# Patient Record
Sex: FEMALE | Race: WHITE | Employment: OTHER | URBAN - METROPOLITAN AREA
[De-identification: names, ages, dates, MRNs, and addresses within clinical notes are randomized per-mention and may not be internally consistent; named-entity substitution may affect disease eponyms.]

---

## 2021-08-17 ENCOUNTER — HOSPITAL ENCOUNTER (OUTPATIENT)
Dept: PHYSICAL THERAPY | Age: 76
Discharge: HOME | End: 2021-08-17
Payer: MEDICARE

## 2021-08-17 PROCEDURE — 97162 PT EVAL MOD COMPLEX 30 MIN: CPT

## 2021-08-17 NOTE — PROGRESS NOTES
Janes Parents  : 1945  [place]  Secondary: [facility] (2-RH) at [facility]  [address] [place] [address]  [phone]   [phone]        OUTPATIENT PHYSICAL THERAPY: Daily Treatment Note 2021  Visit Count:  1    ICD-10: Treatment Diagnosis: Radiculopathy, cervical region, M54.12  Radiculopathy, lumbar region, M54.16  Pain in joint, right shoulder, M25.511  Difficulty walking, not elsewhere classified, R26.2  Precautions/Allergies:   Patient has no allergy information on record. TREATMENT PLAN:  Effective Dates: 2021 TO 11/15/2021 (90 days). Frequency/Duration: 1 time a week for 12 weeks MEDICAL/REFERRING DIAGNOSIS:  Radiculopathy, lumbar region [M54.16]  Radiculopathy, cervical region [M54.12]   DATE OF ONSET: chronic  REFERRING PHYSICIAN: Benjamin Venegas NP MD Orders: Evaluate and Treat  Return MD Appointment:        Pre-treatment Symptoms/Complaints:  Patient reports ongoing LE pain and numbness, UE pain, neck pain and right shoulder pain and difficulty with reaching  Pain: Initial: Pain Intensity 1: 6 10 Post Session:  4/10   Medications Last Reviewed:  2021  Updated Objective Findings:  See evaluation note from today  TREATMENT:     Evaluation and education  Sciatic nerve glide and axial elongation  MedBridge Portal  Treatment/Session Summary:    · Response to Treatment:  Patient understands HEP and POC at this time. · Communication/Consultation:  None today  · Equipment provided today:  None today  · Recommendations/Intent for next treatment session: Next visit will focus on Cervical mobility, right shoulder passive motion and nerve mobility with training for stenosis. Total Treatment Billable Duration:  0 minutes  PT Patient Time In/Time Out  Time In: 1100  Time Out: [address] PT    No future appointments.

## 2021-08-17 NOTE — THERAPY EVALUATION
Amy Zelaya  : 1945  [place]  Secondary: [facility] (2-RH) at [address] [place] [address]  [phone]   [phone]          OUTPATIENT PHYSICAL THERAPY:Initial Assessment 2021   ICD-10: Treatment Diagnosis: Radiculopathy, cervical region, M54.12  Radiculopathy, lumbar region, M54.16  Pain in joint, right shoulder, M25.511  Difficulty walking, not elsewhere classified, R26.2  Precautions/Allergies:   Patient has no allergy information on record. TREATMENT PLAN:  Effective Dates: 2021 TO 11/15/2021 (90 days). Frequency/Duration: 1 time a week for 12 weeks MEDICAL/REFERRING DIAGNOSIS:  Radiculopathy, lumbar region [M54.16]  Radiculopathy, cervical region [M54.12]   DATE OF ONSET: chronic  REFERRING PHYSICIAN: Rosendo Jimenez NP MD Orders: Evaluate and Treat  Return MD Appointment:      INITIAL ASSESSMENT:  Ms. Wing Samuel presents with increased stiffness in the cervical and lumbar spine associated with stenosis. She shows limitation in all mobility of the cervical spine with some radicular pain. Her LE exhibit radicular pain and symptoms as well. Her right shoulder seems to have a different separate issue with a rotator cuff pathology. She is limited in all ER strength and overall lifting ability. She is a good candidate for skilled PT in order to address listed impairments affecting her function. Phyllis Frazier, PT, DPT, OCS, CFMT      PROBLEM LIST (Impacting functional limitations):  1. Decreased Strength  2. Decreased ADL/Functional Activities  3. Decreased Transfer Abilities  4. Decreased Ambulation Ability/Technique  5. Decreased Balance  6. Increased Pain  7. Decreased Activity Tolerance  8. Decreased Flexibility/Joint Mobility INTERVENTIONS PLANNED: (Treatment may consist of any combination of the following)  1. Balance Exercise  2.  Bed Mobility  3. Cold  4. Cryotherapy  5. Electrical Stimulation  6. Gait Training  7. Heat  8. Manual Therapy  9. Neuromuscular Re-education/Strengthening  10. Range of Motion (ROM)  11. Therapeutic Activites  12. Therapeutic Exercise/Strengthening     GOALS: (Goals have been discussed and agreed upon with patient.)  Short-Term Functional Goals: Time Frame: 4 weeks  1. Patient will show a greater than 10% improvement in left cervical rotation in order to progress driving function  2. Patient will show a greater than 10 degree increase in right shoulder ER in order to progress shoulder function  3. Patient will be independent in HEP for right shoulder passive mobility  Discharge Goals: Time Frame: 12 weeks  1. Patient will report a greater than 50% improvement in overall leg pain symptoms in order to return to walking  2. Patient will show a greater than 5 point decrease on the NDI in order to show an increase in function  3. Patient will report driving without pain increase  4. Patient will be independent in [facility] for lumbar and cervical stability exerrcises. OUTCOME MEASURE:   Tool Used: Neck Disability Index (NDI)  Score:  Initial: 19/50  Most Recent: X/50 (Date: -- )   Interpretation of Score: The Neck Disability Index is a revised form of the Oswestry Low Back Pain Index and is designed to measure the activities of daily living in person's with neck pain. Each section is scored on a 0-5 scale, 5 representing the greatest disability. The scores of each section are added together for a total score of 50. Tool Used: Modified Oswestry Low Back Pain Questionnaire  Score:  Initial: 18/50  Most Recent: X/50 (Date: -- )   Interpretation of Score: Each section is scored on a 0-5 scale, 5 representing the greatest disability. The scores of each section are added together for a total score of 50.         MEDICAL NECESSITY:   · Patient is expected to demonstrate progress in strength, range of motion, balance, coordination and functional technique to improve functional mobility. · Patient demonstrates good rehab potential due to higher previous functional level. · Skilled intervention continues to be required due to increased pain and dysfunction. REASON FOR SERVICES/OTHER COMMENTS:  · Patient continues to require skilled intervention due to decreased mobility. Total Duration:  PT Patient Time In/Time Out  Time In: 1100  Time Out: 1200    Rehabilitation Potential For Stated Goals: Excellent  Regarding Lucy SUDEEP Myers's therapy, I certify that the treatment plan above will be carried out by a therapist or under their direction. Thank you for this referral,  Lola Kenney, PT     Referring Physician Signature: Juanita Bocanegra NP _______________________________ Date _____________      PAIN/SUBJECTIVE:   Initial: Pain Intensity 1: 6 /10 Post Session:  4/10   HISTORY:   History of Injury/Illness (Reason for Referral):  Patient reports that she has been in pretty good condition till this past year and she started to have increased achy feeling in bilateral LE and some issues with her neck and R UE. She reports that if she is up and moving around she gets tired and more achy feeling in the lumbar spine and back of the legs. She also has difficulty with turning her head and feels locked up at times. She is having issues with using her R UE for anything above her shoulder height. She has had and MRI and will bring the report in next visit. She wants to be able to get back to her exercises and daily activities without pain increase. Past Medical History/Comorbidities:   Ms. Moses Zapien  has a past medical history of Cancer ([address] Ms. Moses Zapien  has a past surgical history that includes hx gyn and hx orthopaedic. She has Diabetes type II, peripheral neuropathy in B LE.  She reports some heart issues  Social History/Living Environment:       Social History     Socioeconomic History    Marital status:      Spouse name: Not on file    Number of children: Not on file    Years of education: Not on file    Highest education level: Not on file   Occupational History    Not on file   Tobacco Use    Smoking status: Never Smoker   Substance and Sexual Activity    Alcohol use: No    Drug use: No    Sexual activity: Not on file   Other Topics Concern    Not on file   Social History Narrative    Not on file     Social Determinants of Health     Financial Resource Strain:     Difficulty of Paying Living Expenses:    Food Insecurity:     Worried About Running Out of Food in the Last Year:     [address] in the Last Year:    Transportation Needs:     Lack of Transportation (Medical):  Lack of Transportation (Non-Medical):    Physical Activity:     Days of Exercise per Week:     Minutes of Exercise per Session:    Stress:     Feeling of Stress :    Social Connections:     Frequency of Communication with Friends and Family:     Frequency of Social Gatherings with Friends and Family:     Attends Faith Services:     Active Member of Clubs or Organizations:     Attends Club or Organization Meetings:     Marital Status:    Intimate Partner Violence:     Fear of Current or Ex-Partner:     Emotionally Abused:     Physically Abused:     Sexually Abused:        Prior Level of Function/Work/Activity:  Independent, retired  Dominant Side:         RIGHT   Ambulatory/Rehab [address] [place] [address] Risk Assessment   Risk Factors:       (5)  History of Recent Falls [w/in 3 months] Ability to Rise from Chair:       (1)  Pushes up, successful in one attempt   [address]:       No modifications necessary   Total: (5 or greater = High Risk): 6   ©2010 [facility] [place] [address] [place] [address] States Patent #[id number].  Federal Law prohibits the replication, distribution or use without written permission from [facility] [place]   Current Medications:       Current Outpatient Medications:    atorvastatin (LIPITOR) 40 mg tablet, Take 40 mg by mouth daily. , Disp: , Rfl:     esomeprazole (NEXIUM) 20 mg capsule, Take  by mouth daily. , Disp: , Rfl:     aspirin 81 mg chewable tablet, Take 81 mg by mouth daily. , Disp: , Rfl:     IRON/VITAMIN B COMP W-C (GERITOL COMPLETE PO), Take  by mouth., Disp: , Rfl:     calcium-vitamin D (CALCIUM 500+D) 500 mg(1,250mg) -200 unit per tablet, Take 1 Tab by mouth two (2) times daily (with meals). , Disp: , Rfl:    Date Last Reviewed:  8/17/2021     Number of Personal Factors/Comorbidities that affect the Plan of Care: 1-2: MODERATE COMPLEXITY   EXAMINATION:   Observation/Orthostatic Postural Assessment:          Patient shows increased forward head and rounded shoulders posture. She shows increased right shoulder elevation and decreased ability to raise the R UE above shoulder height. She shows increased lordosis in standing and increased left pelvic and right ribcage shift. She shows some scoliosis through her thoracic spine.     Palpation:          Tightness along paraspinals in standing and increased tightness in right QL  -Increased restriction in right and left hip rotators  -SLR shows good mobility of hamstrings  -Cervical spine shows increased suboccipital tightness, increased right upper trap restrictions and she holds it tight/compenstaion  -Decreased cervical spine mobility for PA direction in lower cervical spine  -Limited with side bending and rotation motion throughout the cervical spine  -elevated right first rib  -Increased joint crepitus in right shoulder with abduction, ER and horizontal abduction  -SCM tightness B  -Decreased diaphragmatic breathing  ROM:          Cervical spine: rotation R:60%, L:35% with pain  Side bending right to 2 fingers and left at 3 fingers  Flexion full and extension at 25%  Lumbar spine shows decreased reversal with flexion  R UE: ER at 30 deg with pain, abduction at 90 deg with compensation  Strength:          2/5 for right ER with pain, abduction 2+/5 due to pain and lack of full motion  LE show 4/5 with some weakness with hip flexion  Special Tests:          Cervical stability/vertebral artery tests:   1. Sharper Pursor: (-)  2. Alar ligament: (-)  3. Shear test: (-)  4. Tectorial membrane distraction:(-)  5. Transverse ligament lift up test: (-)      Neurological Screen:        Dermatomes:  Limited in feet due to peripheral neuropathy        Neural Tension Tests:  (+) for sciatic nerve tension  Functional Mobility:         Gait/Ambulation:  Patient shows very shortened step length and shuffling pattern. Increased forward head and trunk lean        Transfers:  Decreased weight shift and acceptance   Body Structures Involved:  1. Nerves  2. Thoracic Cage  3. Bones  4. Joints  5. Muscles  6. Ligaments Body Functions Affected:  1. Sensory/Pain  2. Neuromusculoskeletal  3. Movement Related Activities and Participation Affected:  1. General Tasks and Demands  2. Mobility  3. Self Care  4. Interpersonal Interactions and Relationships  5.  Community, Social and [place]   Number of elements (examined above) that affect the Plan of Care: 4+: HIGH COMPLEXITY   CLINICAL PRESENTATION:   Presentation: Evolving clinical presentation with changing clinical characteristics: MODERATE COMPLEXITY   CLINICAL DECISION MAKING:   Use of outcome tool(s) and clinical judgement create a POC that gives a: Questionable prediction of patient's progress: MODERATE COMPLEXITY

## 2021-08-24 ENCOUNTER — HOSPITAL ENCOUNTER (OUTPATIENT)
Dept: PHYSICAL THERAPY | Age: 76
Discharge: HOME | End: 2021-08-24
Payer: MEDICARE

## 2021-08-24 PROCEDURE — 97110 THERAPEUTIC EXERCISES: CPT

## 2021-08-24 PROCEDURE — 97140 MANUAL THERAPY 1/> REGIONS: CPT

## 2021-08-24 NOTE — PROGRESS NOTES
Jewels Stoner  : 1945  [place]  Secondary: [facility] (2-) at [facility]  [address] [place] [address]  [phone]   [phone]        OUTPATIENT PHYSICAL THERAPY: Daily Treatment Note 2021  Visit Count:  2    ICD-10: Treatment Diagnosis: Radiculopathy, cervical region, M54.12  Radiculopathy, lumbar region, M54.16  Pain in joint, right shoulder, M25.511  Difficulty walking, not elsewhere classified, R26.2  Precautions/Allergies:   Patient has no allergy information on record. TREATMENT PLAN:  Effective Dates: 2021 TO 11/15/2021 (90 days). Frequency/Duration: 1 time a week for 12 weeks MEDICAL/REFERRING DIAGNOSIS:  Radiculopathy, lumbar region [M54.16]  Radiculopathy, cervical region [M54.12]   DATE OF ONSET: chronic  REFERRING PHYSICIAN: Audrey Chisholm NP MD Orders: Evaluate and Treat  Return MD Appointment:        Pre-treatment Symptoms/Complaints:  Patient reports that the shoulder is still bothering her and her neck movement more today. She still has some numbness into both hands at times  Pain: Initial: Pain Intensity 1: 5 /10 Post Session:  4/10   Medications Last Reviewed:  2021  Updated Objective Findings:  Cervical rotation R 60% and left 45%  TREATMENT:     THERAPEUTIC EXERCISE: (15 minutes):  Exercises per grid below to improve mobility, strength, balance and coordination. Required minimal visual, verbal and manual cues to promote proper body alignment, promote proper body posture, promote proper body mechanics and promote proper body breathing techniques. Progressed resistance, range and complexity of movement as indicated.    Date:  21 Date:   Date:     Activity/Exercise Parameters Parameters Parameters   Axial elongation 10 x 10 sec hold     Wand ER Supine with arm supported x 20     Supine ER With yellow band x 20     Scap retractions X 20 in sitting     Single knee to chest 3 x 30 sec B                     MANUAL THERAPY: (40 minutes): Joint mobilization and Soft tissue mobilization was utilized and necessary because of the patient's restricted joint motion and restricted motion of soft tissue.   -Supine mobilization to cervical paraspinals, sub-occipitals, upper traps and lateral right shoulder  -Gentle traction mobilization for stenosis reduction  -PROM to cervical spine with mobilization for side bending and rotation  -Right UE distraction with perturbation  -Passive ER with end range hold for facilitation  -Forward elevation to 90 deg with distraction  -  Sciatic nerve glide and axial elongation  MedBridge Portal  Treatment/Session Summary:    · Response to Treatment: Patient shows some improvement in ER activation today. Continues to need cues for axial elongation and for relaxation of the UE  · Communication/Consultation:  None today  · Equipment provided today:  None today  · Recommendations/Intent for next treatment session: Next visit will focus on Cervical mobility, right shoulder passive motion and nerve mobility with training for stenosis.     Total Treatment Billable Duration:  55 minutes  PT Patient Time In/Time Out  Time In: 1002  Time Out: [facility], PT    Future Appointments   Date Time Provider Luzma Young   8/31/2021 10:00 AM Neftali Rothman, PT [facility]   9/7/2021 11:00 AM Chris Marques, PT [facility]   9/14/2021 10:00 AM Chris Marques, PT [facility]   9/21/2021 10:00 AM Chris Marques, PT [facility]   9/28/2021 10:00 AM Chris Marques, PT [facility]

## 2021-08-31 ENCOUNTER — HOSPITAL ENCOUNTER (OUTPATIENT)
Dept: PHYSICAL THERAPY | Age: 76
Discharge: HOME | End: 2021-08-31
Payer: MEDICARE

## 2021-08-31 PROCEDURE — 97110 THERAPEUTIC EXERCISES: CPT

## 2021-08-31 PROCEDURE — 97140 MANUAL THERAPY 1/> REGIONS: CPT

## 2021-08-31 NOTE — PROGRESS NOTES
Janes Parents  : 1945  [place]  Secondary: [facility] (2-RH) at [facility]  [address] [place] [address]  [phone]   [phone]        OUTPATIENT PHYSICAL THERAPY: Daily Treatment Note 2021  Visit Count:  3    ICD-10: Treatment Diagnosis: Radiculopathy, cervical region, M54.12  Radiculopathy, lumbar region, M54.16  Pain in joint, right shoulder, M25.511  Difficulty walking, not elsewhere classified, R26.2  Precautions/Allergies:   Patient has no allergy information on record. TREATMENT PLAN:  Effective Dates: 2021 TO 11/15/2021 (90 days). Frequency/Duration: 1 time a week for 12 weeks MEDICAL/REFERRING DIAGNOSIS:  Radiculopathy, lumbar region [M54.16]  Radiculopathy, cervical region [M54.12]   DATE OF ONSET: chronic  REFERRING PHYSICIAN: Benjamin Venegas NP MD Orders: Evaluate and Treat  Return MD Appointment:        Pre-treatment Symptoms/Complaints:  Patient reports that the shoulder is still bothering her and her neck movement more today. She had a little less numbness in the right hand and more in the left today  Pain: Initial: Pain Intensity 1: 4 /10 Post Session:  10   Medications Last Reviewed:  2021  Updated Objective Findings:  Cervical rotation R 60% and left 45%  TREATMENT:     THERAPEUTIC EXERCISE: (15 minutes):  Exercises per grid below to improve mobility, strength, balance and coordination. Required minimal visual, verbal and manual cues to promote proper body alignment, promote proper body posture, promote proper body mechanics and promote proper body breathing techniques. Progressed resistance, range and complexity of movement as indicated.    Date:  21 Date:  21 Date:     Activity/Exercise Parameters Parameters Parameters   Axial elongation 10 x 10 sec hold 10 x 10 sec hold    Wand ER Supine with arm supported x 20 Supine with arm supported x 20    Supine ER With yellow band x 20 With yellow band x 20    Scap retractions X 20 in sitting X 20 in sitting    Single knee to chest 3 x 30 sec B     Wall elevation  With towel and B UE x 20    Band extension  Bilateral with red band x 20        MANUAL THERAPY: (40 minutes): Joint mobilization and Soft tissue mobilization was utilized and necessary because of the patient's restricted joint motion and restricted motion of soft tissue.   -Supine mobilization to cervical paraspinals, sub-occipitals, upper traps and lateral right shoulder  -Gentle traction mobilization for stenosis reduction  -PROM to cervical spine with mobilization for side bending and rotation  -Right UE distraction with perturbation  -Passive ER with end range hold for facilitation  -Forward elevation to 90 deg with distraction  -  Sciatic nerve glide and axial elongation  MedBridge Portal  Treatment/Session Summary:    · Response to Treatment: Patient shows some improvement in ER activation today. Still weak through right rotator cuff with some improvement with forward elevation  · Communication/Consultation:  None today  · Equipment provided today:  None today  · Recommendations/Intent for next treatment session: Next visit will focus on Cervical mobility, right shoulder passive motion and nerve mobility with training for stenosis.     Total Treatment Billable Duration:  55 minutes  PT Patient Time In/Time Out  Time In: 8138  Time Out: [address], PT    Future Appointments   Date Time Provider Luzma Young   9/7/2021 11:00 AM Neftali Rothman, PT [facility]   9/14/2021 10:00 AM Chris Marques, PT [facility]   9/21/2021 10:00 AM Chris Marques, PT [facility]   9/28/2021 10:00 AM Chris Marques, PT [facility]

## 2021-09-07 ENCOUNTER — HOSPITAL ENCOUNTER (OUTPATIENT)
Dept: PHYSICAL THERAPY | Age: 76
Discharge: HOME | End: 2021-09-07
Payer: MEDICARE

## 2021-09-07 PROCEDURE — 97110 THERAPEUTIC EXERCISES: CPT

## 2021-09-07 PROCEDURE — 97140 MANUAL THERAPY 1/> REGIONS: CPT

## 2021-09-07 NOTE — PROGRESS NOTES
Kyle Paulson  : 1945  [place]  Secondary: [facility] (2-) at [facility]  [address] [place] [address]  [phone]   [phone]        OUTPATIENT PHYSICAL THERAPY: Daily Treatment Note 2021  Visit Count:  4    ICD-10: Treatment Diagnosis: Radiculopathy, cervical region, M54.12  Radiculopathy, lumbar region, M54.16  Pain in joint, right shoulder, M25.511  Difficulty walking, not elsewhere classified, R26.2  Precautions/Allergies:   Patient has no allergy information on record. TREATMENT PLAN:  Effective Dates: 2021 TO 11/15/2021 (90 days). Frequency/Duration: 1 time a week for 12 weeks MEDICAL/REFERRING DIAGNOSIS:  Radiculopathy, lumbar region [M54.16]  Radiculopathy, cervical region [M54.12]   DATE OF ONSET: chronic  REFERRING PHYSICIAN: Casie Dietz NP MD Orders: Evaluate and Treat  Return MD Appointment:        Pre-treatment Symptoms/Complaints:  Patient reports that the shoulder is doing a little better today with less pain. She reports still a little more pain in the legs  Pain: Initial: Pain Intensity 1: 3 /10 Post Session:  10   Medications Last Reviewed:  2021  Updated Objective Findings:  Cervical rotation R 60% and left 45%  TREATMENT:     THERAPEUTIC EXERCISE: (15 minutes):  Exercises per grid below to improve mobility, strength, balance and coordination. Required minimal visual, verbal and manual cues to promote proper body alignment, promote proper body posture, promote proper body mechanics and promote proper body breathing techniques. Progressed resistance, range and complexity of movement as indicated.    Date:  21 Date:  21 Date:  21   Activity/Exercise Parameters Parameters Parameters   Axial elongation 10 x 10 sec hold 10 x 10 sec hold 10 x 10 sec hold   Wand ER Supine with arm supported x 20 Supine with arm supported x 20    Supine ER With yellow band x 20 With yellow band x 20 Standing walk outs with yellow band x 1 min   Scap retractions X 20 in sitting X 20 in sitting Standing with green band rows x 20   Single knee to chest 3 x 30 sec B     Wall elevation  With towel and B UE x 20 With towel and B UE x 20   Band extension  Bilateral with red band x 20 Bilateral with red band x 20   Hamstring glides   X 10 B   Lower trunk rotations   X 10 B   Breathing training   X 5 min       MANUAL THERAPY: (40 minutes): Joint mobilization and Soft tissue mobilization was utilized and necessary because of the patient's restricted joint motion and restricted motion of soft tissue.   -Supine mobilization to cervical paraspinals, sub-occipitals, upper traps and lateral right shoulder  -Gentle traction mobilization for stenosis reduction  -PROM to cervical spine with mobilization for side bending and rotation  -Right UE distraction with perturbation  -Passive ER with end range hold for facilitation  -Forward elevation to 90 deg with distraction  -  Sciatic nerve glide and axial elongation  MedBridge Portal  Treatment/Session Summary:    · Response to Treatment: Patient shows some improvement in ER activation today. We worked more on breathing today to help facilitate relaxation  · Communication/Consultation:  None today  · Equipment provided today:  None today  · Recommendations/Intent for next treatment session: Next visit will focus on Cervical mobility, right shoulder passive motion and nerve mobility with training for stenosis.     Total Treatment Billable Duration:  55 minutes  PT Patient Time In/Time Out  Time In: 1102  Time Out: \[facility]\"" [address] PT    Future Appointments   Date Time Provider Luzma Young   9/14/2021 10:00 AM Heather Hendrix, PT [facility]   9/21/2021 10:00 AM Jeremy Marques, PT [facility]   9/28/2021 10:00 AM Jeremy Marques, PT [facility]

## 2021-09-14 ENCOUNTER — HOSPITAL ENCOUNTER (OUTPATIENT)
Dept: PHYSICAL THERAPY | Age: 76
Discharge: HOME | End: 2021-09-14
Payer: MEDICARE

## 2021-09-14 PROCEDURE — 97140 MANUAL THERAPY 1/> REGIONS: CPT

## 2021-09-14 PROCEDURE — 97110 THERAPEUTIC EXERCISES: CPT

## 2021-09-14 NOTE — PROGRESS NOTES
Sukumar Singh  : 1945  [place]  Secondary: [facility] (2-) at [facility]  [address] [place] [address]  [phone]   [phone]        OUTPATIENT PHYSICAL THERAPY: Daily Treatment Note 2021  Visit Count:  5    ICD-10: Treatment Diagnosis: Radiculopathy, cervical region, M54.12  Radiculopathy, lumbar region, M54.16  Pain in joint, right shoulder, M25.511  Difficulty walking, not elsewhere classified, R26.2  Precautions/Allergies:   Patient has no allergy information on record. TREATMENT PLAN:  Effective Dates: 2021 TO 11/15/2021 (90 days). Frequency/Duration: 1 time a week for 12 weeks MEDICAL/REFERRING DIAGNOSIS:  Radiculopathy, lumbar region [M54.16]  Radiculopathy, cervical region [M54.12]   DATE OF ONSET: chronic  REFERRING PHYSICIAN: Juanita Bocanegra NP MD Orders: Evaluate and Treat  Return MD Appointment:        Pre-treatment Symptoms/Complaints:  Patient reports that the neck is pretty stiff today. She is getting an MRI today on her lower back  Pain: Initial: Pain Intensity 1: 4 /10 Post Session:  4/10   Medications Last Reviewed:  2021  Updated Objective Findings:  Cervical rotation R 60% and left 45%  TREATMENT:     THERAPEUTIC EXERCISE: (15 minutes):  Exercises per grid below to improve mobility, strength, balance and coordination. Required minimal visual, verbal and manual cues to promote proper body alignment, promote proper body posture, promote proper body mechanics and promote proper body breathing techniques. Progressed resistance, range and complexity of movement as indicated.    Date:  21 Date:  21 Date:  21 Date:  21   Activity/Exercise Parameters Parameters Parameters    Axial elongation 10 x 10 sec hold 10 x 10 sec hold 10 x 10 sec hold 10 x 10 sec hold with need cues for technique   Wand ER Supine with arm supported x 20 Supine with arm supported x 20     Supine ER With yellow band x 20 With yellow band x 20 Standing walk outs with yellow band x 1 min    Scap retractions X 20 in sitting X 20 in sitting Standing with green band rows x 20 Pivot prone x 20   Single knee to chest 3 x 30 sec B      Wall elevation  With towel and B UE x 20 With towel and B UE x 20 With towel and B UE x 20   Band extension  Bilateral with red band x 20 Bilateral with red band x 20    Hamstring glides   X 10 B X 10 B   Lower trunk rotations   X 10 B X 10 B   Breathing training   X 5 min X 10 B       MANUAL THERAPY: (40 minutes): Joint mobilization and Soft tissue mobilization was utilized and necessary because of the patient's restricted joint motion and restricted motion of soft tissue.   -Supine mobilization to cervical paraspinals, sub-occipitals, upper traps and lateral right shoulder  -Gentle traction mobilization for stenosis reduction  -PROM to cervical spine mobilization for side bending and rotation  -Right UE distraction with perturbation  -Passive ER with end range hold for facilitation  -Forward elevation to 90 deg with distraction  -  Sciatic nerve glide and axial elongation  MedBridge Portal  Treatment/Session Summary:    · Response to Treatment: Patient shows improvement in forward elevation to 125 deg. Continue to work on cervical decompression for nerve issues  · Communication/Consultation:  None today  · Equipment provided today:  None today  · Recommendations/Intent for next treatment session: Next visit will focus on Cervical mobility, right shoulder passive motion and nerve mobility with training for stenosis.     Total Treatment Billable Duration:  55 minutes  PT Patient Time In/Time Out  Time In: 1000  Time Out: [address], PT    Future Appointments   Date Time Provider Luzma Young   9/21/2021 10:00 AM Trina Chavira, PT [facility]   9/28/2021 10:00 AM Rocco Marques, ALEJANDRO [facility]

## 2021-09-21 ENCOUNTER — HOSPITAL ENCOUNTER (OUTPATIENT)
Dept: PHYSICAL THERAPY | Age: 76
Discharge: HOME | End: 2021-09-21
Payer: MEDICARE

## 2021-09-21 PROCEDURE — 97110 THERAPEUTIC EXERCISES: CPT

## 2021-09-21 PROCEDURE — 97140 MANUAL THERAPY 1/> REGIONS: CPT

## 2021-09-21 NOTE — PROGRESS NOTES
Lorraine End  : 1945  [place]  Secondary: [facility] (2-) at [facility]  [address] [place] [address]  [phone]   [phone]        OUTPATIENT PHYSICAL THERAPY: Daily Treatment Note 2021  Visit Count:  6    ICD-10: Treatment Diagnosis: Radiculopathy, cervical region, M54.12  Radiculopathy, lumbar region, M54.16  Pain in joint, right shoulder, M25.511  Difficulty walking, not elsewhere classified, R26.2  Precautions/Allergies:   Patient has no allergy information on record. TREATMENT PLAN:  Effective Dates: 2021 TO 11/15/2021 (90 days). Frequency/Duration: 1 time a week for 12 weeks MEDICAL/REFERRING DIAGNOSIS:  Radiculopathy, lumbar region [M54.16]  Radiculopathy, cervical region [M54.12]   DATE OF ONSET: chronic  REFERRING PHYSICIAN: Sumi Doan NP MD Orders: Evaluate and Treat  Return MD Appointment:        Pre-treatment Symptoms/Complaints:  Patient reports that the shoulder seems to be moving a little better and stiff at times  Pain: Initial: Pain Intensity 1: 5 /10 Post Session:  4/10   Medications Last Reviewed:  2021  Updated Objective Findings:  Cervical rotation R 60% and left 45%  TREATMENT:     THERAPEUTIC EXERCISE: (15 minutes):  Exercises per grid below to improve mobility, strength, balance and coordination. Required minimal visual, verbal and manual cues to promote proper body alignment, promote proper body posture, promote proper body mechanics and promote proper body breathing techniques. Progressed resistance, range and complexity of movement as indicated.    Date:  21 Date:  21 Date:  21 Date:  21 Date:  21   Activity/Exercise Parameters Parameters Parameters     Axial elongation 10 x 10 sec hold 10 x 10 sec hold 10 x 10 sec hold 10 x 10 sec hold with need cues for technique 10 x 10 sec hold with need cues for technique   Wand ER Supine with arm supported x 20 Supine with arm supported x 20      Supine ER With yellow band x 20 With yellow band x 20 Standing walk outs with yellow band x 1 min  Sitting ER with yellow band x 20   Scap retractions X 20 in sitting X 20 in sitting Standing with green band rows x 20 Pivot prone x 20    Single knee to chest 3 x 30 sec B       Wall elevation  With towel and B UE x 20 With towel and B UE x 20 With towel and B UE x 20 With towel and B UE x 20 circles as well today x 15 CW/CCW   Band extension  Bilateral with red band x 20 Bilateral with red band x 20  Bilateral with red band x 20   Hamstring glides   X 10 B X 10 B    Lower trunk rotations   X 10 B X 10 B    Breathing training   X 5 min X 10 B    UBE     3 min forward/3 min backward       MANUAL THERAPY: (30 minutes): Joint mobilization and Soft tissue mobilization was utilized and necessary because of the patient's restricted joint motion and restricted motion of soft tissue.   -Supine mobilization to cervical paraspinals, sub-occipitals, upper traps and lateral right shoulder  -Gentle traction mobilization for stenosis reduction  -PROM to cervical spine mobilization for side bending and rotation  -Right UE distraction with perturbation  -Passive ER with end range hold for facilitation  -Forward elevation to 90 deg with distraction  -  Sciatic nerve glide and axial elongation  MedBridge Portal  Treatment/Session Summary:    · Response to Treatment: Patient shows improvement in forward elevation to 125 deg. She shows less compensation today with the shoulder   · Communication/Consultation:  None today  · Equipment provided today:  None today  · Recommendations/Intent for next treatment session: Next visit will focus on Cervical mobility, right shoulder passive motion and nerve mobility with training for stenosis.     Total Treatment Billable Duration:  55 minutes  PT Patient Time In/Time Out  Time In: 1007  Time Out: [address] [facility], PT    Future Appointments   Date Time Provider Luzma Young   9/28/2021 10:00 AM Quinn Doretha, PT [facility]

## 2021-09-28 ENCOUNTER — HOSPITAL ENCOUNTER (OUTPATIENT)
Dept: PHYSICAL THERAPY | Age: 76
Discharge: HOME | End: 2021-09-28
Payer: MEDICARE

## 2021-09-28 PROCEDURE — 97110 THERAPEUTIC EXERCISES: CPT

## 2021-09-28 PROCEDURE — 97140 MANUAL THERAPY 1/> REGIONS: CPT

## 2021-09-28 NOTE — PROGRESS NOTES
Betito Durán  : 1945  [place]  Secondary: [facility] (2-) at [facility]  [address] [place] [address]  [phone]   [phone]        OUTPATIENT PHYSICAL THERAPY: Daily Treatment Note 2021  Visit Count:  7    ICD-10: Treatment Diagnosis: Radiculopathy, cervical region, M54.12  Radiculopathy, lumbar region, M54.16  Pain in joint, right shoulder, M25.511  Difficulty walking, not elsewhere classified, R26.2  Precautions/Allergies:   Patient has no allergy information on record. TREATMENT PLAN:  Effective Dates: 2021 TO 11/15/2021 (90 days). Frequency/Duration: 1 time a week for 12 weeks MEDICAL/REFERRING DIAGNOSIS:  Radiculopathy, lumbar region [M54.16]  Radiculopathy, cervical region [M54.12]   DATE OF ONSET: chronic  REFERRING PHYSICIAN: Haim Ulloa NP MD Orders: Evaluate and Treat  Return MD Appointment:        Pre-treatment Symptoms/Complaints:  Patient reports that the shoulder seems to be moving a little better with some neck pain today  Pain: Initial: Pain Intensity 1: 4 /10 Post Session:  4/10   Medications Last Reviewed:  2021  Updated Objective Findings:  Cervical rotation R 60% and left 45%  TREATMENT:     THERAPEUTIC EXERCISE: (15 minutes):  Exercises per grid below to improve mobility, strength, balance and coordination. Required minimal visual, verbal and manual cues to promote proper body alignment, promote proper body posture, promote proper body mechanics and promote proper body breathing techniques. Progressed resistance, range and complexity of movement as indicated.    Date:  21 Date:  21 Date:  21 Date:  21 Date:  21 Date:  21 Date:     Activity/Exercise Parameters Parameters Parameters       Axial elongation 10 x 10 sec hold 10 x 10 sec hold 10 x 10 sec hold 10 x 10 sec hold with need cues for technique 10 x 10 sec hold with need cues for technique 10 x 10 sec hold with need cues for technique    Wand ER Supine with arm supported x 20 Supine with arm supported x 20        Supine ER With yellow band x 20 With yellow band x 20 Standing walk outs with yellow band x 1 min  Sitting ER with yellow band x 20     Scap retractions X 20 in sitting X 20 in sitting Standing with green band rows x 20 Pivot prone x 20      Single knee to chest 3 x 30 sec B         Wall elevation  With towel and B UE x 20 With towel and B UE x 20 With towel and B UE x 20 With towel and B UE x 20 circles as well today x 15 CW/CCW reviewed    Band extension  Bilateral with red band x 20 Bilateral with red band x 20  Bilateral with red band x 20     Hamstring glides   X 10 B X 10 B      Lower trunk rotations   X 10 B X 10 B      Breathing training   X 5 min X 10 B      UBE     3 min forward/3 min backward 3 min forward/3 min backward    Pulleys      X 3 min        MANUAL THERAPY: (30 minutes): Joint mobilization and Soft tissue mobilization was utilized and necessary because of the patient's restricted joint motion and restricted motion of soft tissue.   -Supine mobilization to cervical paraspinals, sub-occipitals, upper traps and lateral right shoulder  -Gentle traction mobilization for stenosis reduction  -PROM to cervical spine mobilization for side bending and rotation  -Right UE distraction with perturbation  -Passive ER with end range hold for facilitation  -Forward elevation to 90 deg with distraction  -  Sciatic nerve glide and axial elongation  MedBridge Portal  Treatment/Session Summary:    · Response to Treatment: Patient shows improvement in forward elevation to 125 deg.  Still some increased guarding with cervical mobility and some right sided tightness after treatment  · Communication/Consultation:  None today  · Equipment provided today:  None today  · Recommendations/Intent for next treatment session: Next visit will focus on Cervical mobility, right shoulder passive motion and nerve mobility with training for stenosis.     Total Treatment Billable Duration:  55 minutes  PT Patient Time In/Time Out  Time In: 1001  Time Out: [address], PT    Future Appointments   Date Time Provider Luzma Young   10/12/2021 10:00 AM Pilar Amaro, PT [facility]   10/26/2021 10:00 AM Darren Marques, PT [facility]

## 2021-10-12 ENCOUNTER — HOSPITAL ENCOUNTER (OUTPATIENT)
Dept: PHYSICAL THERAPY | Age: 76
Discharge: HOME | End: 2021-10-12
Payer: MEDICARE

## 2021-10-12 PROCEDURE — 97110 THERAPEUTIC EXERCISES: CPT

## 2021-10-12 PROCEDURE — 97140 MANUAL THERAPY 1/> REGIONS: CPT

## 2021-10-12 NOTE — PROGRESS NOTES
Sang Medina  : 1945  [place]  Secondary: [facility] (2-) at [facility]  [address] [place] [address]  [phone]   [phone]        OUTPATIENT PHYSICAL THERAPY: Daily Treatment Note 10/12/2021  Visit Count:  8    ICD-10: Treatment Diagnosis: Radiculopathy, cervical region, M54.12  Radiculopathy, lumbar region, M54.16  Pain in joint, right shoulder, M25.511  Difficulty walking, not elsewhere classified, R26.2  Precautions/Allergies:   Patient has no allergy information on record. TREATMENT PLAN:  Effective Dates: 2021 TO 11/15/2021 (90 days). Frequency/Duration: 1 time a week for 12 weeks MEDICAL/REFERRING DIAGNOSIS:  Radiculopathy, lumbar region [M54.16]  Radiculopathy, cervical region [M54.12]   DATE OF ONSET: chronic  REFERRING PHYSICIAN: Agnieszka Bear NP MD Orders: Evaluate and Treat  Return MD Appointment:        Pre-treatment Symptoms/Complaints:  Patient reports that the shoulder seems to be moving a little better with some neck pain today  Pain: Initial: Pain Intensity 1: 3 10 Post Session:  4/10   Medications Last Reviewed:  10/12/2021  Updated Objective Findings:  Cervical rotation R 60% and left 45%  TREATMENT:     THERAPEUTIC EXERCISE: (14 minutes):  Exercises per grid below to improve mobility, strength, balance and coordination. Required minimal visual, verbal and manual cues to promote proper body alignment, promote proper body posture, promote proper body mechanics and promote proper body breathing techniques. Progressed resistance, range and complexity of movement as indicated.    Date:  21 Date:  21 Date:  21 Date:  21 Date:  21 Date:  21 Date:  10/12/21   Activity/Exercise Parameters Parameters Parameters       Axial elongation 10 x 10 sec hold 10 x 10 sec hold 10 x 10 sec hold 10 x 10 sec hold with need cues for technique 10 x 10 sec hold with need cues for technique 10 x 10 sec hold with need cues for technique 10 x 10 sec hold with need cues for technique   Wand ER Supine with arm supported x 20 Supine with arm supported x 20        Supine ER With yellow band x 20 With yellow band x 20 Standing walk outs with yellow band x 1 min  Sitting ER with yellow band x 20     Scap retractions X 20 in sitting X 20 in sitting Standing with green band rows x 20 Pivot prone x 20   X 10   Single knee to chest 3 x 30 sec B         Wall elevation  With towel and B UE x 20 With towel and B UE x 20 With towel and B UE x 20 With towel and B UE x 20 circles as well today x 15 CW/CCW reviewed With towel and B UE x 20 circles as well today x 15   Band extension  Bilateral with red band x 20 Bilateral with red band x 20  Bilateral with red band x 20  Bilateral with red band x 20   Hamstring glides   X 10 B X 10 B      Lower trunk rotations   X 10 B X 10 B      Breathing training   X 5 min X 10 B      UBE     3 min forward/3 min backward 3 min forward/3 min backward 3 min forward/3 min backward   Pulleys      X 3 min        MANUAL THERAPY: (30 minutes): Joint mobilization and Soft tissue mobilization was utilized and necessary because of the patient's restricted joint motion and restricted motion of soft tissue.   -Supine mobilization to cervical paraspinals, sub-occipitals, upper traps and lateral right shoulder  -Gentle traction mobilization for stenosis reduction  -PROM to cervical spine mobilization for side bending and rotation  -Right UE distraction with perturbation  -Passive ER with end range hold for facilitation  -Forward elevation to 90 deg with distraction  -  Sciatic nerve glide and axial elongation  [facility] Portal  Treatment/Session Summary:    · Response to Treatment: Patient shows improvement in forward elevation to 125 deg.  Pain is decreasing for the most part,but she stays guarded some  · Communication/Consultation:  None today  · Equipment provided today:  None today  · Recommendations/Intent for next treatment session: Next visit will focus on Cervical mobility, right shoulder passive motion and nerve mobility with training for stenosis.     Total Treatment Billable Duration:  44 minutes  PT Patient Time In/Time Out  Time In: 1010  Time Out: 9246 Xander Garza, PT    Future Appointments   Date Time Provider Luzma Young   10/26/2021 10:00 AM Tamar Hopper, PT [facility]

## 2021-10-26 ENCOUNTER — HOSPITAL ENCOUNTER (OUTPATIENT)
Dept: PHYSICAL THERAPY | Age: 76
Discharge: HOME | End: 2021-10-26
Payer: MEDICARE

## 2021-10-26 PROCEDURE — 97140 MANUAL THERAPY 1/> REGIONS: CPT

## 2021-10-26 PROCEDURE — 97110 THERAPEUTIC EXERCISES: CPT

## 2021-10-26 NOTE — PROGRESS NOTES
Dileep [place]lain  : 1945  [place]  Secondary: [facility] at [facility]  [address] [place] [address]  [phone]   [phone]        OUTPATIENT PHYSICAL THERAPY: Daily Treatment Note 10/26/2021  Visit Count:  9    ICD-10: Treatment Diagnosis: Radiculopathy, cervical region, M54.12  Radiculopathy, lumbar region, M54.16  Pain in joint, right shoulder, M25.511  Difficulty walking, not elsewhere classified, R26.2  Precautions/Allergies:   Patient has no allergy information on record. TREATMENT PLAN:  Effective Dates: 2021 TO 11/15/2021 (90 days). Frequency/Duration: 1 time a week for 12 weeks MEDICAL/REFERRING DIAGNOSIS:  Radiculopathy, lumbar region [M54.16]  Radiculopathy, cervical region [M54.12]   DATE OF ONSET: chronic  REFERRING PHYSICIAN: Molina Ford NP MD Orders: Evaluate and Treat  Return MD Appointment:        Pre-treatment Symptoms/Complaints:  Patient reports that the shoulder seems to be moving a little better with some neck pain today, but that is a little better too. She has started a steroid dose pack for the back pain and that helps some, but that seems to have made her BP go up a little  Pain: Initial: Pain Intensity 1: 4 /10 Post Session:  4/10   Medications Last Reviewed:  10/26/2021  Updated Objective Findings:  Cervical rotation R 60% and left 45%, /82 mmHg at beginning of session  TREATMENT:     THERAPEUTIC EXERCISE: (15 minutes):  Exercises per grid below to improve mobility, strength, balance and coordination. Required minimal visual, verbal and manual cues to promote proper body alignment, promote proper body posture, promote proper body mechanics and promote proper body breathing techniques. Progressed resistance, range and complexity of movement as indicated.    Date:  21 Date:  21 Date:  21 Date:  21 Date:  21 Date:  21 Date:  10/12/21 Date:  10/26/21   Activity/Exercise Parameters Parameters Parameters        Axial elongation 10 x 10 sec hold 10 x 10 sec hold 10 x 10 sec hold 10 x 10 sec hold with need cues for technique 10 x 10 sec hold with need cues for technique 10 x 10 sec hold with need cues for technique 10 x 10 sec hold with need cues for technique 10 x 10 sec hold with need cues for technique   Wand ER Supine with arm supported x 20 Supine with arm supported x 20         Supine ER With yellow band x 20 With yellow band x 20 Standing walk outs with yellow band x 1 min  Sitting ER with yellow band x 20      Scap retractions X 20 in sitting X 20 in sitting Standing with green band rows x 20 Pivot prone x 20   X 10    Single knee to chest 3 x 30 sec B          Wall elevation  With towel and B UE x 20 With towel and B UE x 20 With towel and B UE x 20 With towel and B UE x 20 circles as well today x 15 CW/CCW reviewed With towel and B UE x 20 circles as well today x 15 With towel and B UE x 20 circles as well today x 15   Band extension  Bilateral with red band x 20 Bilateral with red band x 20  Bilateral with red band x 20  Bilateral with red band x 20    Hamstring glides   X 10 B X 10 B    X 10 B   Lower trunk rotations   X 10 B X 10 B       Breathing training   X 5 min X 10 B       UBE     3 min forward/3 min backward 3 min forward/3 min backward 3 min forward/3 min backward 3 min forward/3 min backward   Pulleys      X 3 min         MANUAL THERAPY: (40 minutes): Joint mobilization and Soft tissue mobilization was utilized and necessary because of the patient's restricted joint motion and restricted motion of soft tissue.   -Supine mobilization to cervical paraspinals, sub-occipitals, upper traps and lateral right shoulder  -Gentle traction mobilization for stenosis reduction  -PROM to cervical spine mobilization for side bending and rotation  -Right UE distraction with perturbation  -Passive ER with end range hold for facilitation  -Forward elevation to 90 deg with distraction  -  Sciatic nerve glide and axial elongation  -Hip piriformis stretch B  MedBridge Portal  Treatment/Session Summary:    · Response to Treatment: Patient shows improvement in shoulder mobility. Start to assess the lumbar radiculopathy now. · Communication/Consultation:  None today  · Equipment provided today:  None today  · Recommendations/Intent for next treatment session: Next visit will focus on Cervical mobility, right shoulder passive motion and nerve mobility with training for stenosis.     Total Treatment Billable Duration:  55 minutes  PT Patient Time In/Time Out  Time In: 1000  Time Out: ALEJANDRO Frye    Future Appointments   Date Time Provider Luzma Young   11/11/2021  9:00 AM Shantell Aguilar PT [facility]

## 2021-11-12 ENCOUNTER — HOSPITAL ENCOUNTER (OUTPATIENT)
Dept: PHYSICAL THERAPY | Age: 76
Discharge: HOME | End: 2021-11-12
Payer: MEDICARE

## 2021-11-12 PROCEDURE — 97140 MANUAL THERAPY 1/> REGIONS: CPT

## 2021-11-12 NOTE — PROGRESS NOTES
Jaime Overton  : 1945  [place]  Secondary: [facility] (2-) at [facility]  [address] [place] [address]  [phone]   [phone]        OUTPATIENT PHYSICAL THERAPY: Daily Treatment Note 2021  Visit Count:  10    ICD-10: Treatment Diagnosis: Radiculopathy, cervical region, M54.12  Radiculopathy, lumbar region, M54.16  Pain in joint, right shoulder, M25.511  Difficulty walking, not elsewhere classified, R26.2  Precautions/Allergies:   Patient has no allergy information on record. TREATMENT PLAN:  Effective Dates: 2021 TO 22 (90 days). Frequency/Duration: 1 time every 3-4 weeks for 12 weeks MEDICAL/REFERRING DIAGNOSIS:  Radiculopathy, lumbar region [M54.16]  Radiculopathy, cervical region [M54.12]   DATE OF ONSET: chronic  REFERRING PHYSICIAN: Justin Avilez NP MD Orders: Evaluate and Treat  Return MD Appointment:        Pre-treatment Symptoms/Complaints:  Patient reports that she still has some limitations in the neck and shoulder, but the pain has been much better over the last week. Pain: Initial: Pain Intensity 1: 2 /10 Post Session:  4/10   Medications Last Reviewed:  2021  Updated Objective Findings:  Cervical rotation R 60% and left 45%,   TREATMENT:     THERAPEUTIC EXERCISE: (5 minutes):  Exercises per grid below to improve mobility, strength, balance and coordination. Required minimal visual, verbal and manual cues to promote proper body alignment, promote proper body posture, promote proper body mechanics and promote proper body breathing techniques. Progressed resistance, range and complexity of movement as indicated.    Date:  21 Date:  21 Date:  21 Date:  21 Date:  21 Date:  21 Date:  10/12/21 Date:  10/26/21   Activity/Exercise Parameters Parameters Parameters        Axial elongation 10 x 10 sec hold 10 x 10 sec hold 10 x 10 sec hold 10 x 10 sec hold with need cues for technique 10 x 10 sec hold with need cues for technique 10 x 10 sec hold with need cues for technique 10 x 10 sec hold with need cues for technique 10 x 10 sec hold with need cues for technique   Wand ER Supine with arm supported x 20 Supine with arm supported x 20         Supine ER With yellow band x 20 With yellow band x 20 Standing walk outs with yellow band x 1 min  Sitting ER with yellow band x 20      Scap retractions X 20 in sitting X 20 in sitting Standing with green band rows x 20 Pivot prone x 20   X 10    Single knee to chest 3 x 30 sec B          Wall elevation  With towel and B UE x 20 With towel and B UE x 20 With towel and B UE x 20 With towel and B UE x 20 circles as well today x 15 CW/CCW reviewed With towel and B UE x 20 circles as well today x 15 With towel and B UE x 20 circles as well today x 15   Band extension  Bilateral with red band x 20 Bilateral with red band x 20  Bilateral with red band x 20  Bilateral with red band x 20    Hamstring glides   X 10 B X 10 B    X 10 B   Lower trunk rotations   X 10 B X 10 B       Breathing training   X 5 min X 10 B       UBE     3 min forward/3 min backward 3 min forward/3 min backward 3 min forward/3 min backward 3 min forward/3 min backward   Pulleys      X 3 min         MANUAL THERAPY: (45 minutes): Joint mobilization and Soft tissue mobilization was utilized and necessary because of the patient's restricted joint motion and restricted motion of soft tissue.   -Supine mobilization to cervical paraspinals, sub-occipitals, upper traps and lateral right shoulder  -Gentle traction mobilization for stenosis reduction  -PROM to cervical spine mobilization for side bending and rotation  -Right UE distraction with perturbation  -Passive ER with end range hold for facilitation  -Forward elevation to 90 deg with distraction  -Side lie scapular mobilization as well as pelvic mobility through anterior elevation and posterior depression  Sciatic nerve glide and axial elongation  -Hip piriformis stretch B  MedBridge Portal  Treatment/Session Summary:    · Response to Treatment: Patient shows improvement in shoulder mobility. Follow up every three to four weeks for continued work on shoulder function and mobility  · Communication/Consultation:  None today  · Equipment provided today:  None today  · Recommendations/Intent for next treatment session: Next visit will focus on Cervical mobility, right shoulder passive motion and nerve mobility with training for stenosis.     Total Treatment Billable Duration:  55 minutes  PT Patient Time In/Time Out  Time In: 6071  Time Out: [address] [facility], PT    Future Appointments   Date Time Provider Luzma Young   12/10/2021  9:00 AM Shelton Torres, PT [facility]

## 2021-11-12 NOTE — THERAPY RECERTIFICATION
Huan Raygoza  : 1945  [place]  Secondary: [facility] (2-RH) at [address] [place] [address]  [phone]   [phone]          OUTPATIENT PHYSICAL THERAPY:Recertification    ICD-10: Treatment Diagnosis: Radiculopathy, cervical region, M54.12  Radiculopathy, lumbar region, M54.16  Pain in joint, right shoulder, M25.511  Difficulty walking, not elsewhere classified, R26.2  Precautions/Allergies:   Patient has no allergy information on record. TREATMENT PLAN:  Effective Dates: 2021 TO 22 (90 days). Frequency/Duration: 1 time every 3-4 weeks for 12 weeks MEDICAL/REFERRING DIAGNOSIS:  Radiculopathy, lumbar region [M54.16]  Radiculopathy, cervical region [M54.12]   DATE OF ONSET: chronic  REFERRING PHYSICIAN: Fawn Zarco NP MD Orders: Evaluate and Treat  Return MD Appointment:    Huan Raygoza has been seen for 10 visits from 21 to 2021 for cervical radiculopathy, right shoulder, pain and lumbar radiculpathy. Patient has performed therapeutic exercises, activities, and had manual therapy to increased strength, ROM and function. Patient has also used modalities for pain control in order to increase function. Patient has show an increase in function per the NDI with scores of 14/50 and Modified Oswestry Disability Index score with scores of 16/50. Patient shows some continued difficulty with her shoulder, but reports that overall she does not have pain as much today. Patient has progressed well toward their goals and will benefit from continuing skilled PT in order to address their impairments. Aidan Jacobo, PT, DPT, OCS, CFMT        INITIAL ASSESSMENT:  Ms. Robbin Bundy presents with increased stiffness in the cervical and lumbar spine associated with stenosis. She shows limitation in all mobility of the cervical spine with some radicular pain.   Her LE exhibit radicular pain and symptoms as well. Her right shoulder seems to have a different separate issue with a rotator cuff pathology. She is limited in all ER strength and overall lifting ability. She is a good candidate for skilled PT in order to address listed impairments affecting her function. Robby Whitfield, PT, DPT, OCS, CFMT      PROBLEM LIST (Impacting functional limitations):  1. Decreased Strength  2. Decreased ADL/Functional Activities  3. Decreased Transfer Abilities  4. Decreased Ambulation Ability/Technique  5. Decreased Balance  6. Increased Pain  7. Decreased Activity Tolerance  8. Decreased Flexibility/Joint Mobility INTERVENTIONS PLANNED: (Treatment may consist of any combination of the following)  1. Balance Exercise  2. Bed Mobility  3. Cold  4. Cryotherapy  5. Electrical Stimulation  6. Gait Training  7. Heat  8. Manual Therapy  9. Neuromuscular Re-education/Strengthening  10. Range of Motion (ROM)  11. Therapeutic Activites  12. Therapeutic Exercise/Strengthening     GOALS: (Goals have been discussed and agreed upon with patient.)  Short-Term Functional Goals: Time Frame: 4 weeks  1. Patient will show a greater than 10% improvement in left cervical rotation in order to progress driving function (LLU-40/67/52)  2. Patient will show a greater than 10 degree increase in right shoulder ER in order to progress shoulder function(MET-11/12/21)  3. Patient will be independent in [facility] for right shoulder passive mobility(MET-11/12/21)  Discharge Goals: Time Frame: 12 weeks  1. Patient will report a greater than 50% improvement in overall leg pain symptoms in order to return to walking (ongoing)  2. Patient will show a greater than 5 point decrease on the NDI in order to show an increase in function (ongoing)  3. Patient will report driving without pain increase (ongoing)  4. Patient will be independent in HEP for lumbar and cervical stability exerrcises.  (ongoing)    OUTCOME MEASURE:   Tool Used: Neck Disability Index (NDI)  Score:  Initial: 19/50  Most Recent: 14/50 (Date: 11/12/21 )   Interpretation of Score: The Neck Disability Index is a revised form of the Oswestry Low Back Pain Index and is designed to measure the activities of daily living in person's with neck pain. Each section is scored on a 0-5 scale, 5 representing the greatest disability. The scores of each section are added together for a total score of 50. Tool Used: Modified Oswestry Low Back Pain Questionnaire  Score:  Initial: 18/50  Most Recent: 16/50 (Date: 11/12/21 )   Interpretation of Score: Each section is scored on a 0-5 scale, 5 representing the greatest disability. The scores of each section are added together for a total score of 50. MEDICAL NECESSITY:   · Patient is expected to demonstrate progress in strength, range of motion, balance, coordination and functional technique to improve functional mobility. · Patient demonstrates good rehab potential due to higher previous functional level. · Skilled intervention continues to be required due to increased pain and dysfunction. REASON FOR SERVICES/OTHER COMMENTS:  · Patient continues to require skilled intervention due to decreased mobility. Total Duration:  PT Patient Time In/Time Out  Time In: 0904  Time Out: 1000    Rehabilitation Potential For Stated Goals: Excellent  Regarding Lucy [place]'s therapy, I certify that the treatment plan above will be carried out by a therapist or under their direction.   Thank you for this referral,  Taurus Gardner PT     Referring Physician Signature: Francoise Mahajan NP _______________________________ Date _____________      PAIN/SUBJECTIVE:   Initial: Pain Intensity 1: 2 /10 Post Session:  4/10   HISTORY:   History of Injury/Illness (Reason for Referral):  Patient reports that she has been in pretty good condition till this past year and she started to have increased achy feeling in bilateral LE and some issues with her neck and R UE. She reports that if she is up and moving around she gets tired and more achy feeling in the lumbar spine and back of the legs. She also has difficulty with turning her head and feels locked up at times. She is having issues with using her R UE for anything above her shoulder height. She has had and MRI and will bring the report in next visit. She wants to be able to get back to her exercises and daily activities without pain increase. Past Medical History/Comorbidities:   Ms. Emir Araya  has a past medical history of Cancer ([facility] [address] Ms. Emir Araya  has a past surgical history that includes hx gyn and hx orthopaedic. She has Diabetes type II, peripheral neuropathy in B LE. She reports some heart issues  Social History/Living Environment:       Social History     Socioeconomic History    Marital status:      Spouse name: Not on file    Number of children: Not on file    Years of education: Not on file    Highest education level: Not on file   Occupational History    Not on file   Tobacco Use    Smoking status: Never Smoker    Smokeless tobacco: Not on file   Substance and Sexual Activity    Alcohol use: No    Drug use: No    Sexual activity: Not on file   Other Topics Concern    Not on file   Social History Narrative    Not on file     Social Determinants of Health     Financial Resource Strain:     Difficulty of Paying Living Expenses: Not on file   Food Insecurity:     Worried About Running Out of Food in the Last Year: Not on file    Alanna of Food in the Last Year: Not on file   Transportation Needs:     Lack of Transportation (Medical): Not on file    Lack of Transportation (Non-Medical):  Not on file   Physical Activity:     Days of Exercise per Week: Not on file    Minutes of Exercise per Session: Not on file   Stress:     Feeling of Stress : Not on file   Social Connections:     Frequency of Communication with Friends and Family: Not on file    Frequency of Social Gatherings with Friends and Family: Not on file    Attends Sabianist Services: Not on file    Active Member of Clubs or Organizations: Not on file    Attends Club or Organization Meetings: Not on file    Marital Status: Not on file   Intimate Partner Violence:     Fear of Current or Ex-Partner: Not on file    Emotionally Abused: Not on file    Physically Abused: Not on file    Sexually Abused: Not on file   Housing Stability:     Unable to Pay for Housing in the Last Year: Not on file    Number of [place] in the Last Year: Not on file    Unstable Housing in the Last Year: Not on file       Prior Level of Function/Work/Activity:  Independent, retired  Dominant Side:         RIGHT   Ambulatory/Rehab [address] Assessment   Risk Factors:       (5)  History of Recent Falls [w/in 3 months] Ability to Rise from Chair:       (1)  Pushes up, successful in one attempt   [address]:       No modifications necessary   Total: (5 or greater = High Risk): 6   ©2010 [facility] . [facility] Patent #[id number]. Federal Law prohibits the replication, distribution or use without written permission from [facility]   Current Medications:       Current Outpatient Medications:     atorvastatin (LIPITOR) 40 mg tablet, Take 40 mg by mouth daily. , Disp: , Rfl:     esomeprazole (NEXIUM) 20 mg capsule, Take  by mouth daily. , Disp: , Rfl:     aspirin 81 mg chewable tablet, Take 81 mg by mouth daily. , Disp: , Rfl:     IRON/VITAMIN B COMP W-C (GERITOL COMPLETE PO), Take  by mouth., Disp: , Rfl:     calcium-vitamin D (CALCIUM 500+D) 500 mg(1,250mg) -200 unit per tablet, Take 1 Tab by mouth two (2) times daily (with meals). , Disp: , Rfl:    Date Last Reviewed:  11/12/2021     Number of Personal Factors/Comorbidities that affect the Plan of Care: 1-2: MODERATE COMPLEXITY   EXAMINATION:   Observation/Orthostatic Postural Assessment:          Patient shows increased forward head and rounded shoulders posture. She shows increased right shoulder elevation and decreased ability to raise the R UE above shoulder height. She shows increased lordosis in standing and increased left pelvic and right ribcage shift. She shows some scoliosis through her thoracic spine. Palpation:          Tightness along paraspinals in standing and increased tightness in right QL  -Increased restriction in right and left hip rotators  -SLR shows good mobility of hamstrings  -Cervical spine shows increased suboccipital tightness, increased right upper trap restrictions and she holds it tight/compenstaion  -Decreased cervical spine mobility for PA direction in lower cervical spine  -Limited with side bending and rotation motion throughout the cervical spine  -elevated right first rib  -Increased joint crepitus in right shoulder with abduction, ER and horizontal abduction  -SCM tightness B  -Decreased diaphragmatic breathing  ROM:          Cervical spine: rotation R:60%, L:45% with pain  Side bending right to 2 fingers and left at 3 fingers  Flexion full and extension at 25%  Lumbar spine shows decreased reversal with flexion  R UE: ER at 50 deg with pain, abduction at 90 deg with compensation  Strength:          2/5 for right ER with pain, abduction 2+/5 due to pain and lack of full motion  LE show 4/5 with some weakness with hip flexion  Special Tests:          Cervical stability/vertebral artery tests:   1. Sharper Pursor: (-)  2. Alar ligament: (-)  3. Shear test: (-)  4. Tectorial membrane distraction:(-)  5. Transverse ligament lift up test: (-)      Neurological Screen:        Dermatomes:  Limited in feet due to peripheral neuropathy        Neural Tension Tests:  (+) for sciatic nerve tension  Functional Mobility:         Gait/Ambulation:  Patient shows very shortened step length and shuffling pattern.  Increased forward head and trunk lean        Transfers:  Decreased weight shift and acceptance   Body Structures Involved:  1. Nerves  2. Thoracic Cage  3. Bones  4. Joints  5. Muscles  6. Ligaments Body Functions Affected:  1. Sensory/Pain  2. Neuromusculoskeletal  3. Movement Related Activities and Participation Affected:  1. General Tasks and Demands  2. Mobility  3. Self Care  4. Interpersonal Interactions and Relationships  5.  Community, Social and [place]   Number of elements (examined above) that affect the Plan of Care: 4+: HIGH COMPLEXITY   CLINICAL PRESENTATION:   Presentation: Evolving clinical presentation with changing clinical characteristics: MODERATE COMPLEXITY   CLINICAL DECISION MAKING:   Use of outcome tool(s) and clinical judgement create a POC that gives a: Questionable prediction of patient's progress: MODERATE COMPLEXITY

## 2021-11-22 NOTE — PROGRESS NOTES
I am accessing Ms. [place]'s chart as a part of our department's internal chart auditing process. I certify that Ms. Madhuri Soto is, or was, a patient in our department.   Thank you,  Shelby Steiner, PTA  11/22/2021

## 2021-12-16 ENCOUNTER — APPOINTMENT (OUTPATIENT)
Dept: PHYSICAL THERAPY | Age: 76
End: 2021-12-16

## 2022-01-07 ENCOUNTER — HOSPITAL ENCOUNTER (OUTPATIENT)
Dept: PHYSICAL THERAPY | Age: 77
Discharge: HOME | End: 2022-01-07
Payer: MEDICARE

## 2022-01-07 PROCEDURE — 97140 MANUAL THERAPY 1/> REGIONS: CPT

## 2022-01-07 NOTE — PROGRESS NOTES
Adriana Hills  : 1945  [place]  Secondary: [facility] (2-) at [facility]  [address] [place] [address]  [phone]   [phone]        OUTPATIENT PHYSICAL THERAPY: Daily Treatment Note 2022  Visit Count:  11    ICD-10: Treatment Diagnosis: Radiculopathy, cervical region, M54.12  Radiculopathy, lumbar region, M54.16  Pain in joint, right shoulder, M25.511  Difficulty walking, not elsewhere classified, R26.2  Precautions/Allergies:   Patient has no allergy information on record. TREATMENT PLAN:  Effective Dates: 2021 TO 22 (90 days). Frequency/Duration: 1 time every 3-4 weeks for 12 weeks MEDICAL/REFERRING DIAGNOSIS:  Radiculopathy, lumbar region [M54.16]  Radiculopathy, cervical region [M54.12]   DATE OF ONSET: chronic  REFERRING PHYSICIAN: Kenan Drake NP MD Orders: Evaluate and Treat  Return MD Appointment:        Pre-treatment Symptoms/Complaints:  Patient reports that she has been doing great till this week with some tightness and soreness in her back. She reports the shoulder is about the same, but doesn't bother her too much. The neck is still stiff due to stress  Pain: Initial: Pain Intensity 1: 3 /10 Post Session:  4/10   Medications Last Reviewed:  2022  Updated Objective Findings:  Cervical rotation R 60% and left 45%,   TREATMENT:     THERAPEUTIC EXERCISE: (5 minutes):  Exercises per grid below to improve mobility, strength, balance and coordination. Required minimal visual, verbal and manual cues to promote proper body alignment, promote proper body posture, promote proper body mechanics and promote proper body breathing techniques. Progressed resistance, range and complexity of movement as indicated.    Date:  21 Date:  21 Date:  21 Date:  21 Date:  21 Date:  21 Date:  10/12/21 Date:  10/26/21   Activity/Exercise Parameters Parameters Parameters        Axial elongation 10 x 10 sec hold 10 x 10 sec hold 10 x 10 sec hold 10 x 10 sec hold with need cues for technique 10 x 10 sec hold with need cues for technique 10 x 10 sec hold with need cues for technique 10 x 10 sec hold with need cues for technique 10 x 10 sec hold with need cues for technique   Wand ER Supine with arm supported x 20 Supine with arm supported x 20         Supine ER With yellow band x 20 With yellow band x 20 Standing walk outs with yellow band x 1 min  Sitting ER with yellow band x 20      Scap retractions X 20 in sitting X 20 in sitting Standing with green band rows x 20 Pivot prone x 20   X 10    Single knee to chest 3 x 30 sec B          Wall elevation  With towel and B UE x 20 With towel and B UE x 20 With towel and B UE x 20 With towel and B UE x 20 circles as well today x 15 CW/CCW reviewed With towel and B UE x 20 circles as well today x 15 With towel and B UE x 20 circles as well today x 15   Band extension  Bilateral with red band x 20 Bilateral with red band x 20  Bilateral with red band x 20  Bilateral with red band x 20    Hamstring glides   X 10 B X 10 B    X 10 B   Lower trunk rotations   X 10 B X 10 B       Breathing training   X 5 min X 10 B       UBE     3 min forward/3 min backward 3 min forward/3 min backward 3 min forward/3 min backward 3 min forward/3 min backward   Pulleys      X 3 min         MANUAL THERAPY: (45 minutes): Joint mobilization and Soft tissue mobilization was utilized and necessary because of the patient's restricted joint motion and restricted motion of soft tissue.   -Supine mobilization to cervical paraspinals, sub-occipitals, upper traps and lateral right shoulder  -Gentle traction mobilization for stenosis reduction  -PROM to cervical spine mobilization for side bending and rotation  -Right UE distraction with perturbation  -Passive ER with end range hold for facilitation  -Forward elevation to 90 deg with distraction  -Side lie scapular mobilization as well as pelvic mobility through anterior elevation and posterior depression  Sciatic nerve glide and axial elongation  -Hip piriformis stretch B  MedBridge Portal  Treatment/Session Summary:    · Response to Treatment: Patient shows improvement in shoulder mobility. Improvement in lumbar soft tissue and pelvic mobility. Follow up in one month for possible discharge  · Communication/Consultation:  None today  · Equipment provided today:  None today  · Recommendations/Intent for next treatment session: Next visit will focus on Cervical mobility, right shoulder passive motion and nerve mobility with training for stenosis.     Total Treatment Billable Duration:  45 minutes  PT Patient Time In/Time Out  Time In: 0901  Time Out: [address], PT    Future Appointments   Date Time Provider Luzma Young   2/7/2022  9:00 AM Lawrence Justin, PT [facility]

## 2022-02-07 ENCOUNTER — HOSPITAL ENCOUNTER (OUTPATIENT)
Dept: PHYSICAL THERAPY | Age: 77
Discharge: HOME | End: 2022-02-07
Payer: MEDICARE

## 2022-02-07 PROCEDURE — 97110 THERAPEUTIC EXERCISES: CPT

## 2022-02-07 PROCEDURE — 97140 MANUAL THERAPY 1/> REGIONS: CPT

## 2022-02-07 NOTE — PROGRESS NOTES
Jamey Johnson  : 1945  [place]  Secondary: [facility] (2-) at [facility]  [address] [place] [address]  [phone]   [phone]        OUTPATIENT PHYSICAL THERAPY: Daily Treatment Note 2022  Visit Count:  12    ICD-10: Treatment Diagnosis: Radiculopathy, cervical region, M54.12  Radiculopathy, lumbar region, M54.16  Pain in joint, right shoulder, M25.511  Difficulty walking, not elsewhere classified, R26.2  Precautions/Allergies:   Patient has no allergy information on record. TREATMENT PLAN:  Effective Dates: 22 TO 22 (90 days). Frequency/Duration: 1 time every 3-4 weeks for 12 weeks MEDICAL/REFERRING DIAGNOSIS:  Radiculopathy, lumbar region [M54.16]  Radiculopathy, cervical region [M54.12]   DATE OF ONSET: chronic  REFERRING PHYSICIAN: Jenni Garza NP MD Orders: Evaluate and Treat  Return MD Appointment:        Pre-treatment Symptoms/Complaints:  Patient reports that she is doing pretty good with mostly right shoulder pain and tightness in her neck  Pain: Initial: Pain Intensity 1: 2 /10 Post Session:  1/10   Medications Last Reviewed:  2022  Updated Objective Findings:  Cervical rotation R 60% and left 45%,   TREATMENT:     THERAPEUTIC EXERCISE: (10 minutes):  Exercises per grid below to improve mobility, strength, balance and coordination. Required minimal visual, verbal and manual cues to promote proper body alignment, promote proper body posture, promote proper body mechanics and promote proper body breathing techniques. Progressed resistance, range and complexity of movement as indicated.    Date:  21 Date:  21 Date:  21 Date:  21 Date:  21 Date:  21 Date:  10/12/21 Date:  10/26/21 Date:  22   Activity/Exercise Parameters Parameters Parameters         Axial elongation 10 x 10 sec hold 10 x 10 sec hold 10 x 10 sec hold 10 x 10 sec hold with need cues for technique 10 x 10 sec hold with need cues for technique 10 x 10 sec hold with need cues for technique 10 x 10 sec hold with need cues for technique 10 x 10 sec hold with need cues for technique 10 x 10 sec hold with need cues for technique   Wand ER Supine with arm supported x 20 Supine with arm supported x 20          Supine ER With yellow band x 20 With yellow band x 20 Standing walk outs with yellow band x 1 min  Sitting ER with yellow band x 20    Reviewed x 2 min   Scap retractions X 20 in sitting X 20 in sitting Standing with green band rows x 20 Pivot prone x 20   X 10     Single knee to chest 3 x 30 sec B           Wall elevation  With towel and B UE x 20 With towel and B UE x 20 With towel and B UE x 20 With towel and B UE x 20 circles as well today x 15 CW/CCW reviewed With towel and B UE x 20 circles as well today x 15 With towel and B UE x 20 circles as well today x 15    Band extension  Bilateral with red band x 20 Bilateral with red band x 20  Bilateral with red band x 20  Bilateral with red band x 20  reviewed   Hamstring glides   X 10 B X 10 B    X 10 B    Lower trunk rotations   X 10 B X 10 B        Breathing training   X 5 min X 10 B        UBE     3 min forward/3 min backward 3 min forward/3 min backward 3 min forward/3 min backward 3 min forward/3 min backward 3 min forward/3 min backward   Pulleys      X 3 min          MANUAL THERAPY: (35 minutes): Joint mobilization and Soft tissue mobilization was utilized and necessary because of the patient's restricted joint motion and restricted motion of soft tissue.   -Supine mobilization to cervical paraspinals, sub-occipitals, upper traps and lateral right shoulder  -Gentle traction mobilization for stenosis reduction  -PROM to cervical spine mobilization for side bending and rotation  -Right UE distraction with perturbation  -Passive ER with end range hold for facilitation  -Forward elevation to 90 deg with distraction  -Side lie scapular mobilization as well as pelvic mobility through anterior elevation and posterior depression  Sciatic nerve glide and axial elongation  -Hip piriformis stretch B  MedBridge Portal  Treatment/Session Summary:    · Response to Treatment: Patient shows improvement in shoulder mobility. Improvement cervical mobility with soft tissue work. Patient still guarded  · Communication/Consultation:  None today  · Equipment provided today:  None today  · Recommendations/Intent for next treatment session: Next visit will focus on Cervical mobility, right shoulder passive motion and nerve mobility with training for stenosis.     Total Treatment Billable Duration:  45 minutes  PT Patient Time In/Time Out  Time In: 0548  Time Out: [address] [facility], PT    Future Appointments   Date Time Provider Luzma Young   3/7/2022  9:00 AM Lucien Funk, PT [facility]

## 2022-02-07 NOTE — THERAPY RECERTIFICATION
Esther Rooney  : 1945  [place]  Secondary: [facility] (2-RH) at [address] [place] [address], [address] [place] [address]  [phone]   [phone]          OUTPATIENT PHYSICAL THERAPY:Recertification 1807   ICD-10: Treatment Diagnosis: Radiculopathy, cervical region, M54.12  Radiculopathy, lumbar region, M54.16  Pain in joint, right shoulder, M25.511  Difficulty walking, not elsewhere classified, R26.2  Precautions/Allergies:   Patient has no allergy information on record. TREATMENT PLAN:  Effective Dates: 22 TO22 (90 days). Frequency/Duration: 1 time every 3-4 weeks for 12 weeks MEDICAL/REFERRING DIAGNOSIS:  Radiculopathy, lumbar region [M54.16]  Radiculopathy, cervical region [M54.12]   DATE OF ONSET: chronic  REFERRING PHYSICIAN: Joelene Duverney, NP MD Orders: Evaluate and Treat  Return MD Appointment:    Esther Rooney has been seen for 12 visits from 21 to 2022 for cervical radiculopathy, right shoulder, pain and lumbar radiculpathy. Patient has performed therapeutic exercises, activities, and had manual therapy to increased strength, ROM and function. Patient has also used modalities for pain control in order to increase function. Patient has shown an increase in function per the NDI with scores of 14/50 and Modified Oswestry Disability Index score with scores of 13/50. Patient shows some continued difficulty with her shoulder, but reports that overall she does not have pain as much today. She has improved overall with function, but has decreased function with the R UE and pain in the cervical spine still. Patient has progressed well toward their goals and will benefit from continuing skilled PT in order to address their impairments.   Marychuy Guerrero, PT, DPT, OCS, CFMT        INITIAL ASSESSMENT:  Ms. Genie Morales presents with increased stiffness in the cervical and lumbar spine associated with stenosis. She shows limitation in all mobility of the cervical spine with some radicular pain. Her LE exhibit radicular pain and symptoms as well. Her right shoulder seems to have a different separate issue with a rotator cuff pathology. She is limited in all ER strength and overall lifting ability. She is a good candidate for skilled PT in order to address listed impairments affecting her function. Gema Conroy, PT, DPT, OCS, CFMT      PROBLEM LIST (Impacting functional limitations):  1. Decreased Strength  2. Decreased ADL/Functional Activities  3. Decreased Transfer Abilities  4. Decreased Ambulation Ability/Technique  5. Decreased Balance  6. Increased Pain  7. Decreased Activity Tolerance  8. Decreased Flexibility/Joint Mobility INTERVENTIONS PLANNED: (Treatment may consist of any combination of the following)  1. Balance Exercise  2. Bed Mobility  3. Cold  4. Cryotherapy  5. Electrical Stimulation  6. Gait Training  7. Heat  8. Manual Therapy  9. Neuromuscular Re-education/Strengthening  10. Range of Motion (ROM)  11. Therapeutic Activites  12. Therapeutic Exercise/Strengthening     GOALS: (Goals have been discussed and agreed upon with patient.)    Discharge Goals: Time Frame: 12 weeks  1. Patient will report a greater than 50% improvement in overall leg pain symptoms in order to return to walking (ongoing)  2. Patient will show a greater than 5 point decrease on the NDI in order to show an increase in function (MET-2/7/22)  3. Patient will report driving without pain increase (ongoing)  4. Patient will be independent in [facility] for lumbar and cervical stability exerrcises. (ongoing)    OUTCOME MEASURE:   Tool Used: Neck Disability Index (NDI)  Score:  Initial: 19/50  Most Recent: 14/50 (Date: 2/7/22 )   Interpretation of Score: The Neck Disability Index is a revised form of the Oswestry Low Back Pain Index and is designed to measure the activities of daily living in person's with neck pain.   Each section is scored on a 0-5 scale, 5 representing the greatest disability. The scores of each section are added together for a total score of 50. Tool Used: Modified Oswestry Low Back Pain Questionnaire  Score:  Initial: 18/50  Most Recent: 13/50 (Date: 2/7/22 )   Interpretation of Score: Each section is scored on a 0-5 scale, 5 representing the greatest disability. The scores of each section are added together for a total score of 50. MEDICAL NECESSITY:   · Patient is expected to demonstrate progress in strength, range of motion, balance, coordination and functional technique to improve functional mobility. · Patient demonstrates good rehab potential due to higher previous functional level. · Skilled intervention continues to be required due to increased pain and dysfunction. REASON FOR SERVICES/OTHER COMMENTS:  · Patient continues to require skilled intervention due to decreased mobility. Total Duration:  PT Patient Time In/Time Out  Time In: 0909  Time Out: 0955    Rehabilitation Potential For Stated Goals: Excellent  Regarding Lucy Myers's therapy, I certify that the treatment plan above will be carried out by a therapist or under their direction. Thank you for this referral,  Juan R Workman, PT     Referring Physician Signature: Luke Herrmann NP _______________________________ Date _____________      PAIN/SUBJECTIVE:   Initial: Pain Intensity 1: 2 /10 Post Session:  4/10   HISTORY:   History of Injury/Illness (Reason for Referral):  Patient reports that she has been in pretty good condition till this past year and she started to have increased achy feeling in bilateral LE and some issues with her neck and R UE. She reports that if she is up and moving around she gets tired and more achy feeling in the lumbar spine and back of the legs. She also has difficulty with turning her head and feels locked up at times.  She is having issues with using her R UE for anything above her shoulder height. She has had and MRI and will bring the report in next visit. She wants to be able to get back to her exercises and daily activities without pain increase. Past Medical History/Comorbidities:   Ms. Wander Woo  has a past medical history of Cancer ([facility] [address] Ms. Wander Woo  has a past surgical history that includes hx gyn and hx orthopaedic. She has Diabetes type II, peripheral neuropathy in B LE. She reports some heart issues  Social History/Living Environment:       Social History     Socioeconomic History    Marital status:      Spouse name: Not on file    Number of children: Not on file    Years of education: Not on file    Highest education level: Not on file   Occupational History    Not on file   Tobacco Use    Smoking status: Never Smoker    Smokeless tobacco: Not on file   Substance and Sexual Activity    Alcohol use: No    Drug use: No    Sexual activity: Not on file   Other Topics Concern    Not on file   Social History Narrative    Not on file     Social Determinants of Health     Financial Resource Strain:     Difficulty of Paying Living Expenses: Not on file   Food Insecurity:     Worried About Running Out of Food in the Last Year: Not on file    Alanna of Food in the Last Year: Not on file   Transportation Needs:     Lack of Transportation (Medical): Not on file    Lack of Transportation (Non-Medical):  Not on file   Physical Activity:     Days of Exercise per Week: Not on file    Minutes of Exercise per Session: Not on file   Stress:     Feeling of Stress : Not on file   Social Connections:     Frequency of Communication with Friends and Family: Not on file    Frequency of Social Gatherings with Friends and Family: Not on file    Attends Jew Services: Not on file    Active Member of Clubs or Organizations: Not on file    Attends Club or Organization Meetings: Not on file    Marital Status: Not on file   Intimate Partner Violence:     Fear of Current or Ex-Partner: Not on file    Emotionally Abused: Not on file    Physically Abused: Not on file    Sexually Abused: Not on file   Housing Stability:     Unable to Pay for Housing in the Last Year: Not on file    Number of [place] in the Last Year: Not on file    Unstable Housing in the Last Year: Not on file       Prior Level of Function/Work/Activity:  Independent, retired  Dominant Side:         RIGHT   Ambulatory/Rehab [facility] Risk Assessment   Risk Factors:       (5)  History of Recent Falls [w/in 3 months] Ability to Rise from Chair:       (1)  Pushes up, successful in one attempt   [address]:       No modifications necessary   Total: (5 or greater = High Risk): 6   ©2010 [facility] [place] [address] [place] [address] States Patent #[id number]. Federal Law prohibits the replication, distribution or use without written permission from [facility] [employer]   Current Medications:       Current Outpatient Medications:     atorvastatin (LIPITOR) 40 mg tablet, Take 40 mg by mouth daily. , Disp: , Rfl:     esomeprazole (NEXIUM) 20 mg capsule, Take  by mouth daily. , Disp: , Rfl:     aspirin 81 mg chewable tablet, Take 81 mg by mouth daily. , Disp: , Rfl:     IRON/VITAMIN B COMP W-C (GERITOL COMPLETE PO), Take  by mouth., Disp: , Rfl:     calcium-vitamin D (CALCIUM 500+D) 500 mg(1,250mg) -200 unit per tablet, Take 1 Tab by mouth two (2) times daily (with meals). , Disp: , Rfl:    Date Last Reviewed:  2/7/2022     Number of Personal Factors/Comorbidities that affect the Plan of Care: 1-2: MODERATE COMPLEXITY   EXAMINATION:   Observation/Orthostatic Postural Assessment:          Patient shows increased forward head and rounded shoulders posture. She shows increased right shoulder elevation and decreased ability to raise the R UE above shoulder height. She shows increased lordosis in standing and increased left pelvic and right ribcage shift.   She shows some scoliosis through her thoracic spine. Palpation:          Tightness along paraspinals in standing and increased tightness in right QL  -Increased restriction in right and left hip rotators  -SLR shows good mobility of hamstrings  -Cervical spine shows increased suboccipital tightness, increased right upper trap restrictions and she holds it tight/compenstaion  -Decreased cervical spine mobility for PA direction in lower cervical spine  -Limited with side bending and rotation motion throughout the cervical spine  -elevated right first rib  -Increased joint crepitus in right shoulder with abduction, ER and horizontal abduction  -SCM tightness B  -Decreased diaphragmatic breathing  ROM:          Cervical spine: rotation R:60%, L:45% with pain  Side bending right to 2 fingers and left at 3 fingers  Flexion full and extension at 25%  Lumbar spine shows decreased reversal with flexion  R UE: ER at 50 deg with pain, abduction at 90 deg with compensation  Strength:          2/5 for right ER with pain, abduction 2+/5 due to pain and lack of full motion  LE show 4/5 with some weakness with hip flexion  Special Tests:          Cervical stability/vertebral artery tests:   1. Sharper Pursor: (-)  2. Alar ligament: (-)  3. Shear test: (-)  4. Tectorial membrane distraction:(-)  5. Transverse ligament lift up test: (-)      Neurological Screen:        Dermatomes:  Limited in feet due to peripheral neuropathy        Neural Tension Tests:  (+) for sciatic nerve tension  Functional Mobility:         Gait/Ambulation:  Patient shows very shortened step length and shuffling pattern. Increased forward head and trunk lean        Transfers:  Decreased weight shift and acceptance   Body Structures Involved:  1. Nerves  2. Thoracic Cage  3. Bones  4. Joints  5. Muscles  6. Ligaments Body Functions Affected:  1. Sensory/Pain  2. Neuromusculoskeletal  3. Movement Related Activities and Participation Affected:  1.  General Tasks and Demands  2. Mobility  3. Self Care  4. Interpersonal Interactions and Relationships  5.  Community, Social and [place]   Number of elements (examined above) that affect the Plan of Care: 4+: HIGH COMPLEXITY   CLINICAL PRESENTATION:   Presentation: Evolving clinical presentation with changing clinical characteristics: MODERATE COMPLEXITY   CLINICAL DECISION MAKING:   Use of outcome tool(s) and clinical judgement create a POC that gives a: Questionable prediction of patient's progress: MODERATE COMPLEXITY

## 2022-03-07 ENCOUNTER — HOSPITAL ENCOUNTER (OUTPATIENT)
Dept: PHYSICAL THERAPY | Age: 77
Discharge: HOME | End: 2022-03-07
Payer: MEDICARE

## 2022-03-07 PROCEDURE — 97140 MANUAL THERAPY 1/> REGIONS: CPT

## 2022-03-07 NOTE — PROGRESS NOTES
Nolberto Wang  : 1945  [place]  Secondary: [facility] (2-) at [facility]  [address] [place] [address]  [phone]   [phone]        OUTPATIENT PHYSICAL THERAPY: Daily Treatment Note 3/7/2022  Visit Count:  13    ICD-10: Treatment Diagnosis: Radiculopathy, cervical region, M54.12  Radiculopathy, lumbar region, M54.16  Pain in joint, right shoulder, M25.511  Difficulty walking, not elsewhere classified, R26.2  Precautions/Allergies:   Patient has no allergy information on record. TREATMENT PLAN:  Effective Dates: 22 TO 22 (90 days). Frequency/Duration: 1 time every 3-4 weeks for 12 weeks MEDICAL/REFERRING DIAGNOSIS:  Radiculopathy, lumbar region [M54.16]  Radiculopathy, cervical region [M54.12]   DATE OF ONSET: chronic  REFERRING PHYSICIAN: Billy Zheng NP MD Orders: Evaluate and Treat  Return MD Appointment:        Pre-treatment Symptoms/Complaints:  Patient reports that she is tight in the neck still and the shoulder doesn't seem to get better. She is thinking she may need surgery later this year. Pain: Initial: Pain Intensity 1: 3 /10 Post Session:  1/10   Medications Last Reviewed:  3/7/2022  Updated Objective Findings:  Cervical rotation R 60% and left 45%,   TREATMENT:     THERAPEUTIC EXERCISE: (0 minutes):  Exercises per grid below to improve mobility, strength, balance and coordination. Required minimal visual, verbal and manual cues to promote proper body alignment, promote proper body posture, promote proper body mechanics and promote proper body breathing techniques. Progressed resistance, range and complexity of movement as indicated.    Date:  21 Date:  21 Date:  21 Date:  21 Date:  21 Date:  21 Date:  10/12/21 Date:  10/26/21 Date:  22   Activity/Exercise Parameters Parameters Parameters         Axial elongation 10 x 10 sec hold 10 x 10 sec hold 10 x 10 sec hold 10 x 10 sec hold with need cues for technique 10 x 10 sec hold with need cues for technique 10 x 10 sec hold with need cues for technique 10 x 10 sec hold with need cues for technique 10 x 10 sec hold with need cues for technique 10 x 10 sec hold with need cues for technique   Wand ER Supine with arm supported x 20 Supine with arm supported x 20          Supine ER With yellow band x 20 With yellow band x 20 Standing walk outs with yellow band x 1 min  Sitting ER with yellow band x 20    Reviewed x 2 min   Scap retractions X 20 in sitting X 20 in sitting Standing with green band rows x 20 Pivot prone x 20   X 10     Single knee to chest 3 x 30 sec B           Wall elevation  With towel and B UE x 20 With towel and B UE x 20 With towel and B UE x 20 With towel and B UE x 20 circles as well today x 15 CW/CCW reviewed With towel and B UE x 20 circles as well today x 15 With towel and B UE x 20 circles as well today x 15    Band extension  Bilateral with red band x 20 Bilateral with red band x 20  Bilateral with red band x 20  Bilateral with red band x 20  reviewed   Hamstring glides   X 10 B X 10 B    X 10 B    Lower trunk rotations   X 10 B X 10 B        Breathing training   X 5 min X 10 B        UBE     3 min forward/3 min backward 3 min forward/3 min backward 3 min forward/3 min backward 3 min forward/3 min backward 3 min forward/3 min backward   Pulleys      X 3 min          MANUAL THERAPY: (40 minutes): Joint mobilization and Soft tissue mobilization was utilized and necessary because of the patient's restricted joint motion and restricted motion of soft tissue.   -Supine mobilization to cervical paraspinals, sub-occipitals, upper traps and lateral right shoulder  -Gentle traction mobilization for stenosis reduction  -PROM to cervical spine mobilization for side bending and rotation  -Right UE distraction with perturbation  -Passive ER with end range hold for facilitation  -Forward elevation to 90 deg with distraction  -Side lie scapular mobilization as well as pelvic mobility through anterior elevation and posterior depression  Sciatic nerve glide and axial elongation  -Hip piriformis stretch B  MedBridge Portal  Treatment/Session Summary:    · Response to Treatment: Patient shows improvement in shoulder mobility. Improvement cervical mobility with soft tissue work. She still has increased pain with shoulder movement that is more active. · Communication/Consultation:  None today  · Equipment provided today:  None today  · Recommendations/Intent for next treatment session: Next visit will focus on Cervical mobility, right shoulder passive motion and nerve mobility with training for stenosis.     Total Treatment Billable Duration:  40 minutes  PT Patient Time In/Time Out  Time In: 0101  Time Out: [address] [facility], PT    Future Appointments   Date Time Provider Luzma Young   4/6/2022  9:00 AM Jannet Rodriguez, PT [facility]

## 2022-04-06 ENCOUNTER — HOSPITAL ENCOUNTER (OUTPATIENT)
Dept: PHYSICAL THERAPY | Age: 77
Discharge: HOME | End: 2022-04-06
Payer: MEDICARE

## 2022-04-06 PROCEDURE — 97140 MANUAL THERAPY 1/> REGIONS: CPT

## 2022-04-06 PROCEDURE — 97110 THERAPEUTIC EXERCISES: CPT

## 2022-04-06 NOTE — PROGRESS NOTES
Mary Kirk  : 1945  [place]  Secondary: [facility] (2-) at [facility]  [address] [place] [address]  [phone]   [phone]        OUTPATIENT PHYSICAL THERAPY: Daily Treatment Note 2022  Visit Count:  14    ICD-10: Treatment Diagnosis: Radiculopathy, cervical region, M54.12  Radiculopathy, lumbar region, M54.16  Pain in joint, right shoulder, M25.511  Difficulty walking, not elsewhere classified, R26.2  Precautions/Allergies:   Patient has no allergy information on record. TREATMENT PLAN:  Effective Dates: 22 TO 22 (90 days). Frequency/Duration: 1 time every 3-4 weeks for 12 weeks MEDICAL/REFERRING DIAGNOSIS:  Radiculopathy, lumbar region [M54.16]  Radiculopathy, cervical region [M54.12]   DATE OF ONSET: chronic  REFERRING PHYSICIAN: Emerson Tejeda NP MD Orders: Evaluate and Treat  Return MD Appointment:        Pre-treatment Symptoms/Complaints:  Patient reports that she is feeling the shoulders some, but overall doing ok  Pain: Initial: Pain Intensity 1: 3 10 Post Session:  1/10   Medications Last Reviewed:  2022  Updated Objective Findings:  Cervical rotation R 60% and left 45%,   TREATMENT:     THERAPEUTIC EXERCISE: (15 minutes):  Exercises per grid below to improve mobility, strength, balance and coordination. Required minimal visual, verbal and manual cues to promote proper body alignment, promote proper body posture, promote proper body mechanics and promote proper body breathing techniques. Progressed resistance, range and complexity of movement as indicated.    Date:  21 Date:  21 Date:  21 Date:  21 Date:  21 Date:  21 Date:  10/12/21 Date:  10/26/21 Date:  22 Date:  22   Activity/Exercise Parameters Parameters Parameters          Axial elongation 10 x 10 sec hold 10 x 10 sec hold 10 x 10 sec hold 10 x 10 sec hold with need cues for technique 10 x 10 sec hold with need cues for technique 10 x 10 sec hold with need cues for technique 10 x 10 sec hold with need cues for technique 10 x 10 sec hold with need cues for technique 10 x 10 sec hold with need cues for technique 10 x 10 sec hold with need cues for technique   Wand ER Supine with arm supported x 20 Supine with arm supported x 20           Supine ER With yellow band x 20 With yellow band x 20 Standing walk outs with yellow band x 1 min  Sitting ER with yellow band x 20    Reviewed x 2 min    Scap retractions X 20 in sitting X 20 in sitting Standing with green band rows x 20 Pivot prone x 20   X 10   X 10   Single knee to chest 3 x 30 sec B            Wall elevation  With towel and B UE x 20 With towel and B UE x 20 With towel and B UE x 20 With towel and B UE x 20 circles as well today x 15 CW/CCW reviewed With towel and B UE x 20 circles as well today x 15 With towel and B UE x 20 circles as well today x 15  With ball today x 3 min   Band extension  Bilateral with red band x 20 Bilateral with red band x 20  Bilateral with red band x 20  Bilateral with red band x 20  reviewed Bilateral with red band x 20   Hamstring glides   X 10 B X 10 B    X 10 B     Lower trunk rotations   X 10 B X 10 B         Breathing training   X 5 min X 10 B         UBE     3 min forward/3 min backward 3 min forward/3 min backward 3 min forward/3 min backward 3 min forward/3 min backward 3 min forward/3 min backward Not today   Pulleys      X 3 min       ER          Standing with yellow band x 20 with walk outs                                 MANUAL THERAPY: (40 minutes): Joint mobilization and Soft tissue mobilization was utilized and necessary because of the patient's restricted joint motion and restricted motion of soft tissue.   -Supine mobilization to cervical paraspinals, sub-occipitals, upper traps and lateral right shoulder  -Gentle traction mobilization for stenosis reduction  -PROM to cervical spine mobilization for side bending and rotation  -Right UE distraction with perturbation  -Passive ER with end range hold for facilitation  -Forward elevation to 90 deg with distraction  -Side lie scapular mobilization as well as pelvic mobility through anterior elevation and posterior depression  Sciatic nerve glide and axial elongation  -Hip piriformis stretch B  MedBridge Portal  Treatment/Session Summary:    · Response to Treatment: Patient shows improvement in shoulder mobility. She shows continued need for sues, but is managing her pain and dysfunction with her exercises  · Communication/Consultation:  None today  · Equipment provided today:  None today  · Recommendations/Intent for next treatment session: Next visit will focus on Cervical mobility, right shoulder passive motion and nerve mobility with training for stenosis.     Total Treatment Billable Duration:  55 minutes  PT Patient Time In/Time Out  Time In: 0001  Time Out: [address] [facility], PT    Future Appointments   Date Time Provider Luzma Young   5/4/2022  9:00 AM Gaynelle Mcardle, PT [facility]

## 2022-05-04 ENCOUNTER — HOSPITAL ENCOUNTER (OUTPATIENT)
Dept: PHYSICAL THERAPY | Age: 77
Discharge: HOME | End: 2022-05-04
Payer: MEDICARE

## 2022-05-04 PROCEDURE — 97140 MANUAL THERAPY 1/> REGIONS: CPT
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
## 2022-06-08 ENCOUNTER — HOSPITAL ENCOUNTER (OUTPATIENT)
Dept: PHYSICAL THERAPY | Age: 77
Setting detail: RECURRING SERIES
Discharge: HOME | End: 2022-06-11
Payer: MEDICARE

## 2022-06-08 PROCEDURE — 97110 THERAPEUTIC EXERCISES: CPT

## 2022-06-08 PROCEDURE — 97140 MANUAL THERAPY 1/> REGIONS: CPT

## 2022-06-08 ASSESSMENT — PAIN SCALES - GENERAL: PAINLEVEL_OUTOF10: 6
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
## 2022-06-08 NOTE — PROGRESS NOTES
Telma Clark  : 1945  Primary: Medicare Part [place] B  Secondary: [address]  Jason Brown [address] [address]  Phone: [phone]  Fax: [phone] No data recorded  No data recorded    PT Visit Info: Total # of Visits to Date: 12      OUTPATIENT PHYSICAL THERAPY:OP NOTE TYPE: Treatment Note 2022       Episode  }Appt Desk              Treatment Diagnosis: Radiculopathy, cervical region, M54.12  Radiculopathy, lumbar region, M54.16  Pain in joint, right shoulder, M25.511  Difficulty walking, not elsewhere classified, R26.2  Low back pain, M54.5  Medical/Referring Diagnosis:  Radiculopathy, lumbar region [M54.16]  Radiculopathy, cervical region [M54.12]  Referring Physician:  Brayan Tompkins MD Orders:  PT Eval and Treat   Date of Onset:  No data recorded   Allergies:   Patient has no allergy information on record. Restrictions/Precautions:  No data recordedNo data recorded   1. Interventions Planned (Treatment may consist of any combination of the following):  Balance Exercise  2. Bed Mobility  3. Cold  4. Cryotherapy  5. Electrical Stimulation  6. Gait Training  7. Heat  8. Manual Therapy  9. Neuromuscular Re-education/Strengthening  10. Range of Motion (ROM)  11. Therapeutic Activites  Therapeutic Exercise/Strengthening  No data recorded   Subjective Comments:Patient reports the ongoing achy back and neck area. She has been having more leg cramps lately, but is trying to keep going     Initial:}    6/10Post Session:       3/10  Medications Last Reviewed:  2022  Updated Objective Findings:  None Today  Treatment   THERAPEUTIC EXERCISE: (10 minutes):  Exercises per grid below to improve mobility, strength, balance and coordination.  Required minimal visual, verbal and manual cues to promote proper body alignment, promote proper body posture, promote proper body mechanics and promote proper body breathing techniques.  Progressed resistance, range and complexity of movement as indicated.    Date:  8/24/21 Date:  8/31/21 Date:  9/7/21 Date:  9/14/21 Date:  9/21/21 Date:  9/28/21 Date:  10/12/21 Date:  10/26/21 Date:  2/7/22 Date:  4/6/22 Date:  6/8/22   Activity/Exercise Parameters Parameters Parameters                  Axial elongation 10 x 10 sec hold 10 x 10 sec hold 10 x 10 sec hold 10 x 10 sec hold with need cues for technique 10 x 10 sec hold with need cues for technique 10 x 10 sec hold with need cues for technique 10 x 10 sec hold with need cues for technique 10 x 10 sec hold with need cues for technique 10 x 10 sec hold with need cues for technique 10 x 10 sec hold with need cues for technique 10 x 10 sec hold   Wand ER Supine with arm supported x 20 Supine with arm supported x 20                    Supine ER With yellow band x 20 With yellow band x 20 Standing walk outs with yellow band x 1 min   Sitting ER with yellow band x 20       Reviewed x 2 min      Scap retractions X 20 in sitting X 20 in sitting Standing with green band rows x 20 Pivot prone x 20     X 10     X 10    Single knee to chest 3 x 30 sec B                   3 x 30 sec B   Wall elevation   With towel and B UE x 20 With towel and B UE x 20 With towel and B UE x 20 With towel and B UE x 20 circles as well today x 15 CW/CCW reviewed With towel and B UE x 20 circles as well today x 15 With towel and B UE x 20 circles as well today x 15   With ball today x 3 min    Band extension   Bilateral with red band x 20 Bilateral with red band x 20   Bilateral with red band x 20   Bilateral with red band x 20   reviewed Bilateral with red band x 20    Hamstring glides     X 10 B X 10 B       X 10 B        Lower trunk rotations     X 10 B X 10 B             X 10   Breathing training     X 5 min X 10 B                UBE         3 min forward/3 min backward 3 min forward/3 min backward 3 min forward/3 min backward 3 min forward/3 min backward 3 min forward/3 min backward Not today    Pulleys           X 3 min            ER                   Standing with yellow band x 20 with walk outs Supine B ER with yellow band x 20                             Hamstring mobilizatoin                     Supine kick ups x 10 B                                     MANUAL THERAPY: (45 minutes): Joint mobilization and Soft tissue mobilization was utilized and necessary because of the patient's restricted joint motion and restricted motion of soft tissue.   -Supine mobilization to cervical paraspinals, sub-occipitals, upper traps and lateral right shoulder  -Gentle traction mobilization for stenosis reduction  -PROM to cervical spine mobilization for side bending and rotation  -Right UE distraction with perturbation  -Passive ER with end range hold for facilitation  -Forward elevation to 90 deg with distraction  -Side lie scapular mobilization as well as pelvic mobility through anterior elevation and posterior depression  Sciatic nerve glide and axial elongation  -Hip piriformis stretch B        Treatment/Session Summary:    · Treatment Assessment:Patient shows some improvement in lumbar and LE mobility. We discussed working on her hamstring mobility with her exercises     · Communication/Consultation:  None today  · Equipment provided today:  None  · Recommendations/Intent for next treatment session: Next visit will focus on Lumbar and cervical mobility and shoulder stability.     Total Treatment Billable Duration:  55 minutes   Time In: 0901  Time Out: 701 Nicanor Mendez PT       Charge Capture  }Post Session Pain  MedBridge Portal  MD Guidelines  Scanned Media  Benefits  MyChart    Future Appointments   Date Time Provider Keith Jefferson   7/6/2022  9:00 AM Candie Hu PT [facility]
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
## 2022-07-06 ENCOUNTER — HOSPITAL ENCOUNTER (OUTPATIENT)
Dept: PHYSICAL THERAPY | Age: 77
Setting detail: RECURRING SERIES
Discharge: HOME | End: 2022-07-09
Payer: MEDICARE

## 2022-07-06 PROCEDURE — 97140 MANUAL THERAPY 1/> REGIONS: CPT

## 2022-07-06 ASSESSMENT — PAIN SCALES - GENERAL: PAINLEVEL_OUTOF10: 5

## 2022-07-06 NOTE — PROGRESS NOTES
Lilliam Carrier  : 1945  Primary: Medicare Part A And B  Secondary: [address] @ [facility] [address]  Jason Brown [address] [address]  Phone: [phone]  Fax: [phone] No data recorded  No data recorded    PT Visit Info: Total # of Visits to Date: 16      OUTPATIENT PHYSICAL THERAPY:OP NOTE TYPE: Treatment Note 2022       Episode  }Appt Desk              Treatment Diagnosis: Radiculopathy, cervical region, M54.12  Radiculopathy, lumbar region, M54.16  Pain in joint, right shoulder, M25.511  Difficulty walking, not elsewhere classified, R26.2  Low back pain, M54.5  Medical/Referring Diagnosis:  Radiculopathy, lumbar region [M54.16]  Radiculopathy, cervical region [M54.12]  Referring Physician:  Jalil Mcneill*  MD Orders:  PT Eval and Treat   TREATMENT PLAN:  Effective Dates: 22 TO 22 (90 days).  Frequency/Duration: 1 time every 3-4 weeks for 12 weeks  Date of Onset:  No data recorded   Allergies:   Patient has no allergy information on record. Restrictions/Precautions:  No data recordedNo data recorded   1. Interventions Planned (Treatment may consist of any combination of the following):  Balance Exercise  2. Bed Mobility  3. Cold  4. Cryotherapy  5. Electrical Stimulation  6. Gait Training  7. Heat  8. Manual Therapy  9. Neuromuscular Re-education/Strengthening  10. Range of Motion (ROM)  11. Therapeutic Activites  Therapeutic Exercise/Strengthening  No data recorded   Subjective Comments:Patient reports the shoulders have been bothering her and the left one has been swollen and bruised that started a week after getting the last booster.  She feels a little run down still, tightness in the neck and some cramps in the legs     Initial:}    5/10Post Session:       2/10  Medications Last Reviewed:  2022  Updated Objective Findings:  None Today  Treatment   THERAPEUTIC EXERCISE: (0 minutes):  Exercises per grid below to improve mobility, strength, balance and coordination.  Required minimal visual, verbal and manual cues to promote proper body alignment, promote proper body posture, promote proper body mechanics and promote proper body breathing techniques.  Progressed resistance, range and complexity of movement as indicated.    Date:  8/24/21 Date:  8/31/21 Date:  9/7/21 Date:  9/14/21 Date:  9/21/21 Date:  9/28/21 Date:  10/12/21 Date:  10/26/21 Date:  2/7/22 Date:  4/6/22 Date:  6/8/22   Activity/Exercise Parameters Parameters Parameters                  Axial elongation 10 x 10 sec hold 10 x 10 sec hold 10 x 10 sec hold 10 x 10 sec hold with need cues for technique 10 x 10 sec hold with need cues for technique 10 x 10 sec hold with need cues for technique 10 x 10 sec hold with need cues for technique 10 x 10 sec hold with need cues for technique 10 x 10 sec hold with need cues for technique 10 x 10 sec hold with need cues for technique 10 x 10 sec hold   Wand ER Supine with arm supported x 20 Supine with arm supported x 20                    Supine ER With yellow band x 20 With yellow band x 20 Standing walk outs with yellow band x 1 min   Sitting ER with yellow band x 20       Reviewed x 2 min      Scap retractions X 20 in sitting X 20 in sitting Standing with green band rows x 20 Pivot prone x 20     X 10     X 10    Single knee to chest 3 x 30 sec B                   3 x 30 sec B   Wall elevation   With towel and B UE x 20 With towel and B UE x 20 With towel and B UE x 20 With towel and B UE x 20 circles as well today x 15 CW/CCW reviewed With towel and B UE x 20 circles as well today x 15 With towel and B UE x 20 circles as well today x 15   With ball today x 3 min    Band extension   Bilateral with red band x 20 Bilateral with red band x 20   Bilateral with red band x 20   Bilateral with red band x 20   reviewed Bilateral with red band x 20    Hamstring glides     X 10 B X 10 B       X 10 B        Lower trunk rotations     X 10 B X 10 B             X 10   Breathing training     X 5 min X 10 B                UBE         3 min forward/3 min backward 3 min forward/3 min backward 3 min forward/3 min backward 3 min forward/3 min backward 3 min forward/3 min backward Not today    Pulleys           X 3 min            ER                   Standing with yellow band x 20 with walk outs Supine B ER with yellow band x 20                             Hamstring mobilizatoin                     Supine kick ups x 10 B                                     MANUAL THERAPY: (45 minutes): Joint mobilization and Soft tissue mobilization was utilized and necessary because of the patient's restricted joint motion and restricted motion of soft tissue.   -Supine mobilization to cervical paraspinals, sub-occipitals, upper traps and lateral right shoulder and left shoulder today  -Gentle traction mobilization for stenosis reduction  -PROM to cervical spine mobilization for side bending and rotation  -Right UE distraction with perturbation  -Passive ER with end range hold for facilitation  -Forward elevation to 90 deg with distraction  -Side lie scapular mobilization as well as pelvic mobility through anterior elevation and posterior depression  Sciatic nerve glide and axial elongation  -Hip piriformis stretch B        Treatment/Session Summary:    · Treatment Assessment:Patient shows good PROM of the shoulders with some muscle stiffness as well as deltoid tightness. She shows some increased tension through upper cervical spine as well as hamstrings today with nerve tension. Follow up in 3-4 weeks with re-certification     · Communication/Consultation:  None today  · Equipment provided today:  None  · Recommendations/Intent for next treatment session: Next visit will focus on Lumbar and cervical mobility and shoulder stability.     Total Treatment Billable Duration:  45 minutes   Time In: 0900  Time Out: [address] [facility] [address], PT       Charge Capture  }Post Session Pain [address] [facility] Portal  MD Guidelines  Scanned Media  Benefits  MyChart    Future Appointments   Date Time Provider Keith Jefferson   8/3/2022  9:00 AM Ashley Goldmann, PT [facility]

## 2022-08-03 ENCOUNTER — HOSPITAL ENCOUNTER (OUTPATIENT)
Dept: PHYSICAL THERAPY | Age: 77
Setting detail: RECURRING SERIES
Discharge: HOME | End: 2022-08-06
Payer: MEDICARE

## 2022-08-03 PROCEDURE — 97140 MANUAL THERAPY 1/> REGIONS: CPT

## 2022-08-03 PROCEDURE — 97110 THERAPEUTIC EXERCISES: CPT

## 2022-08-03 ASSESSMENT — PAIN SCALES - GENERAL: PAINLEVEL_OUTOF10: 4

## 2022-08-03 NOTE — PROGRESS NOTES
Lolita Borjas  : 1945  Primary: Medicare Part A And B  Secondary: [address] @ [facility] [address]  Jason Brown [address] [address]  Phone: [phone]  Fax: [phone] No data recorded  No data recorded    PT Visit Info: Total # of Visits to Date: 25      OUTPATIENT PHYSICAL THERAPY:OP NOTE TYPE: Treatment Note 8/3/2022       Episode  }Appt Desk              Treatment Diagnosis: Radiculopathy, cervical region, M54.12  Radiculopathy, lumbar region, M54.16  Pain in joint, right shoulder, M25.511  Difficulty walking, not elsewhere classified, R26.2  Low back pain, M54.5  Medical/Referring Diagnosis:  Radiculopathy, lumbar region [M54.16]  Radiculopathy, cervical region [M54.12]  Referring Physician:  Arianna Barbosa MD Orders:  PT Eval and Treat   TREATMENT PLAN:  Effective Dates: 8/3/22 TO  (90 days). Frequency/Duration: 1 time every 2 weeks for 6 weeks then 1 time every 3-4 weeks for 6 weeks   Date of Onset:  No data recorded   Allergies:   Patient has no allergy information on record. Restrictions/Precautions:  No data recordedNo data recorded   Interventions Planned (Treatment may consist of any combination of the following):  Balance Exercise  Bed Mobility  Cold  Cryotherapy  Electrical Stimulation  Gait Training  Heat  Manual Therapy  Neuromuscular Re-education/Strengthening  Range of Motion (ROM)  Therapeutic Activites  Therapeutic Exercise/Strengthening  No data recorded   Subjective Comments:Patient reports the shoulders, neck and back have all been causing more pain lately. She reports that she has been having more stress that might be causing the increase in pain.  She still can't do too much with the R UE     Initial:}    4/10Post Session:       2/10  Medications Last Reviewed:  8/3/2022  Updated Objective Findings: see evaluation note  Treatment   THERAPEUTIC EXERCISE: (10 minutes):  Exercises per grid below to improve mobility, strength, balance and coordination. Required minimal visual, verbal and manual cues to promote proper body alignment, promote proper body posture, promote proper body mechanics and promote proper body breathing techniques. Progressed resistance, range and complexity of movement as indicated.     Date:  8/24/21 Date:  8/31/21 Date:  9/7/21 Date:  9/14/21 Date:  9/21/21 Date:  9/28/21 Date:  10/12/21 Date:  10/26/21 Date:  2/7/22 Date:  4/6/22 Date:  6/8/22 Date:  8/3/22   Activity/Exercise Parameters Parameters Parameters                   Axial elongation 10 x 10 sec hold 10 x 10 sec hold 10 x 10 sec hold 10 x 10 sec hold with need cues for technique 10 x 10 sec hold with need cues for technique 10 x 10 sec hold with need cues for technique 10 x 10 sec hold with need cues for technique 10 x 10 sec hold with need cues for technique 10 x 10 sec hold with need cues for technique 10 x 10 sec hold with need cues for technique 10 x 10 sec hold 10 x 10 sec hold   Wand ER Supine with arm supported x 20 Supine with arm supported x 20                     Supine ER With yellow band x 20 With yellow band x 20 Standing walk outs with yellow band x 1 min   Sitting ER with yellow band x 20       Reviewed x 2 min    With yellow band x 20   Scap retractions X 20 in sitting X 20 in sitting Standing with green band rows x 20 Pivot prone x 20     X 10     X 10  X 10   Single knee to chest 3 x 30 sec B                   3 x 30 sec B 3 x 30 sec B   Wall elevation   With towel and B UE x 20 With towel and B UE x 20 With towel and B UE x 20 With towel and B UE x 20 circles as well today x 15 CW/CCW reviewed With towel and B UE x 20 circles as well today x 15 With towel and B UE x 20 circles as well today x 15   With ball today x 3 min     Band extension   Bilateral with red band x 20 Bilateral with red band x 20   Bilateral with red band x 20   Bilateral with red band x 20   reviewed Bilateral with red band x 20     Hamstring glides     X 10 B X 10 B       X 10 B      X 10 B   Lower trunk rotations     X 10 B X 10 B             X 10    Breathing training     X 5 min X 10 B                 UBE         3 min forward/3 min backward 3 min forward/3 min backward 3 min forward/3 min backward 3 min forward/3 min backward 3 min forward/3 min backward Not today     Pulleys           X 3 min             ER                   Standing with yellow band x 20 with walk outs Supine B ER with yellow band x 20                               Hamstring mobilizatoin                     Supine kick ups x 10 B    Hip adduction            Ball squeeze x 15                        MANUAL THERAPY: (30 minutes): Joint mobilization and Soft tissue mobilization was utilized and necessary because of the patient's restricted joint motion and restricted motion of soft tissue.   -Supine mobilization to cervical paraspinals, sub-occipitals, upper traps and lateral right shoulder and left shoulder today  -Gentle traction mobilization for stenosis reduction  -PROM to cervical spine mobilization for side bending and rotation  -Right UE distraction with perturbation  -Passive ER with end range hold for facilitation  -Forward elevation to 90 deg with distraction  -Side lie scapular mobilization as well as pelvic mobility through anterior elevation and posterior depression  Sciatic nerve glide and axial elongation  -Hip piriformis stretch B        Treatment/Session Summary:    Treatment Assessment:Patient shows some increased stiffness int he right shoulder today. She has increased tightness through cervical and lumbar spine as well. Continue to follow up every two weeks at this time     Communication/Consultation:  None today  Equipment provided today:  None  Recommendations/Intent for next treatment session: Next visit will focus on Lumbar and cervical mobility and shoulder stability.     Total Treatment Billable Duration:  40 minutes   Time In: 7932  Time Out: 9862    Nika Francis, PT Charge Capture  }Post Session Pain  MedBridge Portal  MD Guidelines  Scanned Media  Benefits  MyChart    Future Appointments   Date Time Provider Keith Jefferson   8/17/2022  9:00 AM Ashley Goldmann, PT [facility]

## 2022-08-03 NOTE — THERAPY RECERTIFICATION
address their impairments. Gary Leyva, PT, DPT, OCS, CFMT           INITIAL ASSESSMENT:  Ms. Bobbi Javier presents with increased stiffness in the cervical and lumbar spine associated with stenosis. She shows limitation in all mobility of the cervical spine with some radicular pain. Her LE exhibit radicular pain and symptoms as well. Her right shoulder seems to have a different separate issue with a rotator cuff pathology. She is limited in all ER strength and overall lifting ability. She is a good candidate for skilled PT in order to address listed impairments affecting her function. Gary Never, PT, DPT, OCS, CFMT      PROBLEM LIST (Impacting functional limitations):  Decreased Strength  Decreased ADL/Functional Activities  Decreased Transfer Abilities  Decreased Ambulation Ability/Technique  Decreased Balance  Increased Pain  Decreased Activity Tolerance  Decreased Flexibility/Joint Mobility INTERVENTIONS PLANNED: (Treatment may consist of any combination of the following)  Balance Exercise  Bed Mobility  Cold  Cryotherapy  Electrical Stimulation  Gait Training  Heat  Manual Therapy  Neuromuscular Re-education/Strengthening  Range of Motion (ROM)  Therapeutic Activites  Therapeutic Exercise/Strengthening      GOALS: (Goals have been discussed and agreed upon with patient.)     Discharge Goals: Time Frame: 12 weeks  Patient will report a greater than 50% improvement in overall leg pain symptoms in order to return to walking (ongoing)  Patient will show a greater than 5 point decrease on the NDI in order to show an increase in function (MET-2/7/22)  Patient will report driving without pain increase (ongoing)  Patient will be independent in HEP for lumbar and cervical stability exerrcises.  (ongoing)  NEW GOAL:  Patient will be independent in lumbar spine mobility exercises (ongoing)  Patient will show a greater than 5 point decrease on the Modified Oswestry disability (ongoing)     OUTCOME MEASURE:   Tool Used: Neck Disability Index (NDI)  Score:  Initial: 19/50  Most Recent: 16/50 (Date: 8/3/22 )   Interpretation of Score: The Neck Disability Index is a revised form of the Oswestry Low Back Pain Index and is designed to measure the activities of daily living in person's with neck pain. Each section is scored on a 0-5 scale, 5 representing the greatest disability. The scores of each section are added together for a total score of 50. Tool Used: Modified Oswestry Low Back Pain Questionnaire  Score:  Initial: 18/50  Most Recent: 16/50 (Date: 8/3/22 )   Interpretation of Score: Each section is scored on a 0-5 scale, 5 representing the greatest disability. The scores of each section are added together for a total score of 50. MEDICAL NECESSITY:   Patient is expected to demonstrate progress in strength, range of motion, balance, coordination and functional technique to improve functional mobility. Patient demonstrates good rehab potential due to higher previous functional level. Skilled intervention continues to be required due to increased pain and dysfunction. REASON FOR SERVICES/OTHER COMMENTS:  Patient continues to require skilled intervention due to decreased mobility. Total Duration:  PT Patient Time In/Time Out  Time In: 0908  Time Out: 6176     Rehabilitation Potential For Stated Goals: Excellent  Regarding Damari Conroy's therapy, I certify that the treatment plan above will be carried out by a therapist or under their direction.   Thank you for this referral,  Marti Tucker, PT     Referring Physician Signature: Kalli Powers NP _______________________________ Date _____________                PAIN/SUBJECTIVE:   Initial: Pain Intensity 1: 4/10 Post Session:  1/10   HISTORY:   History of Injury/Illness (Reason for Referral):  Patient reports that she has been in pretty good condition till this past year and she started to have increased achy feeling in bilateral LE and some issues with her neck and R UE. She reports that if she is up and moving around she gets tired and more achy feeling in the lumbar spine and back of the legs. She also has difficulty with turning her head and feels locked up at times. She is having issues with using her R UE for anything above her shoulder height. She has had and MRI and will bring the report in next visit. She wants to be able to get back to her exercises and daily activities without pain increase. Past Medical History/Comorbidities:   Ms. García Gunter  has a past medical history of Cancer ([facility] [address] Ms. García Gunter  has a past surgical history that includes hx gyn and hx orthopaedic. She has Diabetes type II, peripheral neuropathy in B LE. She reports some heart issues  Social History/Living Environment:       Social History            Socioeconomic History    Marital status:        Spouse name: Not on file    Number of children: Not on file    Years of education: Not on file    Highest education level: Not on file   Occupational History    Not on file   Tobacco Use    Smoking status: Never Smoker    Smokeless tobacco: Not on file   Substance and Sexual Activity    Alcohol use: No    Drug use: No    Sexual activity: Not on file   Other Topics Concern    Not on file   Social History Narrative    Not on file      Social Determinants of Health          Financial Resource Strain:     Difficulty of Paying Living Expenses: Not on file   Food Insecurity:     Worried About [address] in the Last Year: Not on file    Ran Out of Food in the Last Year: Not on file   Transportation Needs:     Lack of Transportation (Medical): Not on file    Lack of Transportation (Non-Medical):  Not on file   Physical Activity:     Days of Exercise per Week: Not on file    Minutes of Exercise per Session: Not on file   Stress:     Feeling of Stress : Not on file   Social Connections:     Frequency of Communication with Friends and Family: Not on file    Frequency of Social Gatherings with Friends and Family: Not on file    Attends Muslim Services: Not on file    Active Member of Clubs or Organizations: Not on file    Attends Club or Organization Meetings: Not on file    Marital Status: Not on file   Intimate Partner Violence:     Fear of Current or Ex-Partner: Not on file    Emotionally Abused: Not on file    Physically Abused: Not on file    Sexually Abused: Not on file   Housing Stability:     Unable to Pay for Housing in the Last Year: Not on file    Number of [place] in the Last Year: Not on file    Unstable Housing in the Last Year: Not on file         Prior Level of Function/Work/Activity:  Independent, retired  Dominant Side:         RIGHT   Ambulatory/Rehab [address] Assessment   Risk Factors:       (5)  History of Recent Falls [w/in 3 months] Ability to Rise from Chair:       (1)  Pushes up, successful in one attempt   [address]:       No modifications necessary   Total: (5 or greater = High Risk): 6   ©2010 [employer] [address] [place] [address] States Patent #[id number]. Federal Law prohibits the replication, distribution or use without written permission from [employer]   Current Medications:         Current Outpatient Medications:     atorvastatin (LIPITOR) 40 mg tablet, Take 40 mg by mouth daily. , Disp: , Rfl:     esomeprazole (NEXIUM) 20 mg capsule, Take  by mouth daily. , Disp: , Rfl:     aspirin 81 mg chewable tablet, Take 81 mg by mouth daily. , Disp: , Rfl:     IRON/VITAMIN B COMP W-C (GERITOL COMPLETE PO), Take  by mouth., Disp: , Rfl:     calcium-vitamin D (CALCIUM 500+D) 500 mg(1,250mg) -200 unit per tablet, Take 1 Tab by mouth two (2) times daily (with meals). , Disp: , Rfl:    Date Last Reviewed:  5/4/2022      Number of Personal Factors/Comorbidities that affect the Plan of Care: 1-2: MODERATE COMPLEXITY   EXAMINATION:   Observation/Orthostatic Postural Assessment:          Patient shows increased forward head and rounded shoulders posture. She shows increased right shoulder elevation and decreased ability to raise the R UE above shoulder height. She shows increased lordosis in standing and increased left pelvic and right ribcage shift. She shows some scoliosis through her thoracic spine. Palpation:          Tightness along paraspinals in standing and increased tightness in right QL  -Increased restriction in right and left hip rotators  -SLR shows good mobility of hamstrings  -Cervical spine shows increased suboccipital tightness, increased right upper trap restrictions and she holds it tight/compenstaion  -Decreased cervical spine mobility for PA direction in lower cervical spine  -Limited with side bending and rotation motion throughout the cervical spine  -elevated right first rib  -Increased joint crepitus in right shoulder with abduction, ER and horizontal abduction  -SCM tightness B  -Decreased diaphragmatic breathing  -Increased tenderness along right paraspinals and lower border of right ribcage  ROM:          Cervical spine: rotation R:60%, L:45% with pain  Side bending right to 2 fingers and left at 3 fingers  Flexion full and extension at 25%  Lumbar spine shows decreased reversal with flexion  R UE: ER at 60 deg with pain, abduction at 90 deg with compensation  Strength:          2/5 for right ER with pain, abduction 2+/5 due to pain and lack of full motion  LE show 4/5 with some weakness with hip flexion  Special Tests:          Cervical stability/vertebral artery tests:   Sharper Pursor: (-)  Alar ligament: (-)  Shear test: (-)  Tectorial membrane distraction:(-)  Transverse ligament lift up test: (-)        Neurological Screen:        Dermatomes:  Limited in feet due to peripheral neuropathy        Neural Tension Tests:  (+) for sciatic nerve tension  Functional Mobility:         Gait/Ambulation:  Patient shows very shortened step length and shuffling pattern.  Increased forward head and trunk lean        Transfers:  Decreased weight shift and acceptance   Body Structures Involved:  Nerves  Thoracic Cage  Bones  Joints  Muscles  Ligaments Body Functions Affected:  Sensory/Pain  Neuromusculoskeletal  Movement Related Activities and Participation Affected:  General Tasks and Demands  Mobility  Self Care  Interpersonal Interactions and Relationships  Community, Social and Civic Life   Number of elements (examined above) that affect the Plan of Care: 4+: HIGH COMPLEXITY   CLINICAL PRESENTATION:   Presentation: Evolving clinical presentation with changing clinical characteristics: MODERATE COMPLEXITY   CLINICAL DECISION MAKING:   Use of outcome tool(s) and clinical judgement create a POC that gives a: Questionable prediction of patient's progress: MODERATE COMPLEXITY

## 2022-08-17 ENCOUNTER — HOSPITAL ENCOUNTER (OUTPATIENT)
Dept: PHYSICAL THERAPY | Age: 77
Setting detail: RECURRING SERIES
Discharge: HOME | End: 2022-08-20
Payer: MEDICARE

## 2022-08-17 PROCEDURE — 97140 MANUAL THERAPY 1/> REGIONS: CPT

## 2022-08-17 PROCEDURE — 97110 THERAPEUTIC EXERCISES: CPT

## 2022-08-17 ASSESSMENT — PAIN SCALES - GENERAL: PAINLEVEL_OUTOF10: 5

## 2022-08-17 NOTE — PROGRESS NOTES
Mir Huff  : 1945  Primary: Medicare Part A And B  Secondary: [address] @ [facility] [address]  Jason [address] [address]  Phone: [phone]  Fax: [phone] No data recorded  No data recorded    PT Visit Info: Total # of Visits to Date: 23      OUTPATIENT PHYSICAL THERAPY:OP NOTE TYPE: Treatment Note 2022       Episode  }Appt Desk              Treatment Diagnosis: Radiculopathy, cervical region, M54.12  Radiculopathy, lumbar region, M54.16  Pain in joint, right shoulder, M25.511  Difficulty walking, not elsewhere classified, R26.2  Low back pain, M54.5  Medical/Referring Diagnosis:  Radiculopathy, lumbar region [M54.16]  Radiculopathy, cervical region [M54.12]  Referring Physician:  Lisa Stack MD Orders:  PT Eval and Treat   TREATMENT PLAN:  Effective Dates: 8/3/22 TO  (90 days). Frequency/Duration: 1 time every 2 weeks for 6 weeks then 1 time every 3-4 weeks for 6 weeks   Date of Onset:  No data recorded   Allergies:   Patient has no allergy information on record. Restrictions/Precautions:  No data recordedNo data recorded   Interventions Planned (Treatment may consist of any combination of the following):  Balance Exercise  Bed Mobility  Cold  Cryotherapy  Electrical Stimulation  Gait Training  Heat  Manual Therapy  Neuromuscular Re-education/Strengthening  Range of Motion (ROM)  Therapeutic Activites  Therapeutic Exercise/Strengthening  No data recorded   Subjective Comments:Patient reports the legs have been cramping more lately and she still has stiffness through the right shoulder     Initial:}    5/10Post Session:       2/10  Medications Last Reviewed:  2022  Updated Objective Findings: ER to 45 deg before pain. Forward elevation to 80 deg active and 115 deg passive R UE  Treatment   THERAPEUTIC EXERCISE: (10 minutes):  Exercises per grid below to improve mobility, strength, balance and coordination. Required minimal visual, verbal and manual cues to promote proper body alignment, promote proper body posture, promote proper body mechanics and promote proper body breathing techniques. Progressed resistance, range and complexity of movement as indicated.     Date:  8/24/21 Date:  8/31/21 Date:  9/7/21 Date:  9/14/21 Date:  9/21/21 Date:  9/28/21 Date:  10/12/21 Date:  10/26/21 Date:  2/7/22 Date:  4/6/22 Date:  6/8/22 Date:  8/3/22 Date:  8/17/22   Activity/Exercise Parameters Parameters Parameters                    Axial elongation 10 x 10 sec hold 10 x 10 sec hold 10 x 10 sec hold 10 x 10 sec hold with need cues for technique 10 x 10 sec hold with need cues for technique 10 x 10 sec hold with need cues for technique 10 x 10 sec hold with need cues for technique 10 x 10 sec hold with need cues for technique 10 x 10 sec hold with need cues for technique 10 x 10 sec hold with need cues for technique 10 x 10 sec hold 10 x 10 sec hold X 10   Wand ER Supine with arm supported x 20 Supine with arm supported x 20                      Supine ER With yellow band x 20 With yellow band x 20 Standing walk outs with yellow band x 1 min   Sitting ER with yellow band x 20       Reviewed x 2 min    With yellow band x 20    Scap retractions X 20 in sitting X 20 in sitting Standing with green band rows x 20 Pivot prone x 20     X 10     X 10  X 10 X 10   Single knee to chest 3 x 30 sec B                   3 x 30 sec B 3 x 30 sec B    Wall elevation   With towel and B UE x 20 With towel and B UE x 20 With towel and B UE x 20 With towel and B UE x 20 circles as well today x 15 CW/CCW reviewed With towel and B UE x 20 circles as well today x 15 With towel and B UE x 20 circles as well today x 15   With ball today x 3 min      Band extension   Bilateral with red band x 20 Bilateral with red band x 20   Bilateral with red band x 20   Bilateral with red band x 20   reviewed Bilateral with red band x 20   Bilateral with red band x 20 Hamstring glides     X 10 B X 10 B       X 10 B      X 10 B    Lower trunk rotations     X 10 B X 10 B             X 10     Breathing training     X 5 min X 10 B                  UBE         3 min forward/3 min backward 3 min forward/3 min backward 3 min forward/3 min backward 3 min forward/3 min backward 3 min forward/3 min backward Not today      Pulleys           X 3 min           X 3 min   ER                   Standing with yellow band x 20 with walk outs Supine B ER with yellow band x 20                                 Hamstring mobilizatoin                     Supine kick ups x 10 B     Hip adduction            Ball squeeze x 15 Ball squeeze x 15                         MANUAL THERAPY: (44 minutes): Joint mobilization and Soft tissue mobilization was utilized and necessary because of the patient's restricted joint motion and restricted motion of soft tissue.   -Supine mobilization to cervical paraspinals, sub-occipitals, upper traps and lateral right shoulder and left shoulder today  -Gentle traction mobilization for stenosis reduction  -PROM to cervical spine mobilization for side bending and rotation  -Right UE distraction with perturbation  -Passive ER with end range hold for facilitation  -Forward elevation to 90 deg with distraction  -Side lie scapular mobilization as well as pelvic mobility through anterior elevation and posterior depression  Sciatic nerve glide and axial elongation  -Hip piriformis stretch B  -Side lie mobilization to pelvis for posterior depression and anterior elevation activation  -Right hip flexor stretch and left glute and hamstring stretch in supine        Treatment/Session Summary:    Treatment Assessment:Patient shows some increased stiffness int he right shoulder today and shows more restrictions through right hip flexor and left hamstring and glut.  Follow up in two weeks     Communication/Consultation:  None today  Equipment provided today:  None  Recommendations/Intent for next treatment session: Next visit will focus on Lumbar and cervical mobility and shoulder stability.     Total Treatment Billable Duration:  54 minutes   Time In: 0900  Time Out: Keya Ratliff, PT       Charge Capture  }Post Session Pain  MedBridge Portal  MD Guidelines  Scanned Media  Benefits  MyChart    Future Appointments   Date Time Provider Keith Li   9/30/2022  9:00 AM [facility]

## 2022-08-30 ENCOUNTER — HOSPITAL ENCOUNTER (OUTPATIENT)
Dept: PHYSICAL THERAPY | Age: 77
Setting detail: RECURRING SERIES
Discharge: HOME | End: 2022-09-02
Payer: MEDICARE

## 2022-08-30 PROCEDURE — 97140 MANUAL THERAPY 1/> REGIONS: CPT

## 2022-08-30 ASSESSMENT — PAIN SCALES - GENERAL: PAINLEVEL_OUTOF10: 6

## 2022-08-30 NOTE — PROGRESS NOTES
Kay Wilson  : 1945  Primary: Medicare Part A And B  Secondary: [address] @ [employer] [address]  Jason [address] [address]  Phone: [phone]  Fax: [phone] No data recorded  No data recorded    PT Visit Info: Total # of Visits to Date: 21      OUTPATIENT PHYSICAL THERAPY:OP NOTE TYPE: Treatment Note 2022       Episode  }Appt Desk              Treatment Diagnosis: Radiculopathy, cervical region, M54.12  Radiculopathy, lumbar region, M54.16  Pain in joint, right shoulder, M25.511  Difficulty walking, not elsewhere classified, R26.2  Low back pain, M54.5  Medical/Referring Diagnosis:  Radiculopathy, lumbar region [M54.16]  Radiculopathy, cervical region [M54.12]  Referring Physician:  Archana Nunes*  MD Orders:  PT Eval and Treat   TREATMENT PLAN:  Effective Dates: 8/3/22 TO  (90 days). Frequency/Duration: 1 time every 2 weeks for 6 weeks then 1 time every 3-4 weeks for 6 weeks   Date of Onset:  No data recorded   Allergies:   Patient has no allergy information on record. Restrictions/Precautions:  No data recordedNo data recorded   Interventions Planned (Treatment may consist of any combination of the following):  Balance Exercise  Bed Mobility  Cold  Cryotherapy  Electrical Stimulation  Gait Training  Heat  Manual Therapy  Neuromuscular Re-education/Strengthening  Range of Motion (ROM)  Therapeutic Activites  Therapeutic Exercise/Strengthening  No data recorded   Subjective Comments:Patient reports the legs have been cramping more lately and she feels like it is affecting her walking more     Initial:}    6/10Post Session:       2/10  Medications Last Reviewed:  2022  Updated Objective Findings: ER to 45 deg before pain. Forward elevation to 80 deg active and 115 deg passive R UE  Treatment   THERAPEUTIC EXERCISE: (0 minutes):  Exercises per grid below to improve mobility, strength, balance and coordination. Required minimal visual, verbal and manual cues to promote proper body alignment, promote proper body posture, promote proper body mechanics and promote proper body breathing techniques. Progressed resistance, range and complexity of movement as indicated.     Date:  8/24/21 Date:  8/31/21 Date:  9/7/21 Date:  9/14/21 Date:  9/21/21 Date:  9/28/21 Date:  10/12/21 Date:  10/26/21 Date:  2/7/22 Date:  4/6/22 Date:  6/8/22 Date:  8/3/22 Date:  8/17/22   Activity/Exercise Parameters Parameters Parameters                    Axial elongation 10 x 10 sec hold 10 x 10 sec hold 10 x 10 sec hold 10 x 10 sec hold with need cues for technique 10 x 10 sec hold with need cues for technique 10 x 10 sec hold with need cues for technique 10 x 10 sec hold with need cues for technique 10 x 10 sec hold with need cues for technique 10 x 10 sec hold with need cues for technique 10 x 10 sec hold with need cues for technique 10 x 10 sec hold 10 x 10 sec hold X 10   Wand ER Supine with arm supported x 20 Supine with arm supported x 20                      Supine ER With yellow band x 20 With yellow band x 20 Standing walk outs with yellow band x 1 min   Sitting ER with yellow band x 20       Reviewed x 2 min    With yellow band x 20    Scap retractions X 20 in sitting X 20 in sitting Standing with green band rows x 20 Pivot prone x 20     X 10     X 10  X 10 X 10   Single knee to chest 3 x 30 sec B                   3 x 30 sec B 3 x 30 sec B    Wall elevation   With towel and B UE x 20 With towel and B UE x 20 With towel and B UE x 20 With towel and B UE x 20 circles as well today x 15 CW/CCW reviewed With towel and B UE x 20 circles as well today x 15 With towel and B UE x 20 circles as well today x 15   With ball today x 3 min      Band extension   Bilateral with red band x 20 Bilateral with red band x 20   Bilateral with red band x 20   Bilateral with red band x 20   reviewed Bilateral with red band x 20   Bilateral with red band x 20 visit will focus on Lumbar and cervical mobility and shoulder stability.     Total Treatment Billable Duration:  55 minutes   Time In: 1500  Time Out: KITA Wright       Charge Capture  }Post Session Pain  MedBridge Portal  MD Guidelines  Scanned Media  Benefits  MyChart    Future Appointments   Date Time Provider Keith Jefferson   9/23/2022  2:00 PM Michelle Urban PT [facility]   9/30/2022  9:00 AM Michelle Urban PT [facility]

## 2022-09-07 ENCOUNTER — HOSPITAL ENCOUNTER (OUTPATIENT)
Dept: PHYSICAL THERAPY | Age: 77
Setting detail: RECURRING SERIES
Discharge: HOME | End: 2022-09-10
Payer: MEDICARE

## 2022-09-07 PROCEDURE — 97110 THERAPEUTIC EXERCISES: CPT

## 2022-09-07 PROCEDURE — 97140 MANUAL THERAPY 1/> REGIONS: CPT

## 2022-09-07 ASSESSMENT — PAIN SCALES - GENERAL: PAINLEVEL_OUTOF10: 5

## 2022-09-07 NOTE — PROGRESS NOTES
Mikael Patten  : 1945  Primary: Medicare Part A And B  Secondary: [address] @ [facility] [address]  Jason Brown [address] [address]  Phone: [phone]  Fax: [phone] No data recorded  No data recorded    PT Visit Info: Total # of Visits to Date: 24      OUTPATIENT PHYSICAL THERAPY:OP NOTE TYPE: Treatment Note 2022       Episode  }Appt Desk              Treatment Diagnosis: Radiculopathy, cervical region, M54.12  Radiculopathy, lumbar region, M54.16  Pain in joint, right shoulder, M25.511  Difficulty walking, not elsewhere classified, R26.2  Low back pain, M54.5  Medical/Referring Diagnosis:  Radiculopathy, lumbar region [M54.16]  Radiculopathy, cervical region [M54.12]  Referring Physician:  Ariadna Escamilla MD Orders:  PT Eval and Treat   TREATMENT PLAN:  Effective Dates: 8/3/22 TO  (90 days). Frequency/Duration: 1 time every 2 weeks for 6 weeks then 1 time every 3-4 weeks for 6 weeks   Date of Onset:  No data recorded   Allergies:   Patient has no allergy information on record. Restrictions/Precautions:  No data recordedNo data recorded   Interventions Planned (Treatment may consist of any combination of the following):  Balance Exercise  Bed Mobility  Cold  Cryotherapy  Electrical Stimulation  Gait Training  Heat  Manual Therapy  Neuromuscular Re-education/Strengthening  Range of Motion (ROM)  Therapeutic Activites  Therapeutic Exercise/Strengthening  No data recorded   Subjective Comments:Patient reports the legs are still cramping and she is still having some increased back pain. The shoulder is still the same     Initial:}    5/10Post Session:       2/10  Medications Last Reviewed:  2022  Updated Objective Findings: ER to 45 deg before pain.   Forward elevation to 80 deg active and 115 deg passive R UE  Treatment   THERAPEUTIC EXERCISE: (10 minutes):  Exercises per grid below to improve mobility, strength, balance and coordination. Required minimal visual, verbal and manual cues to promote proper body alignment, promote proper body posture, promote proper body mechanics and promote proper body breathing techniques. Progressed resistance, range and complexity of movement as indicated.     Date:  8/24/21 Date:  8/31/21 Date:  9/7/21 Date:  9/14/21 Date:  9/21/21 Date:  9/28/21 Date:  10/12/21 Date:  10/26/21 Date:  2/7/22 Date:  4/6/22 Date:  6/8/22 Date:  8/3/22 Date:  8/17/22 Date:  9/7/22   Activity/Exercise Parameters Parameters Parameters                     Axial elongation 10 x 10 sec hold 10 x 10 sec hold 10 x 10 sec hold 10 x 10 sec hold with need cues for technique 10 x 10 sec hold with need cues for technique 10 x 10 sec hold with need cues for technique 10 x 10 sec hold with need cues for technique 10 x 10 sec hold with need cues for technique 10 x 10 sec hold with need cues for technique 10 x 10 sec hold with need cues for technique 10 x 10 sec hold 10 x 10 sec hold X 10    Wand ER Supine with arm supported x 20 Supine with arm supported x 20                       Supine ER With yellow band x 20 With yellow band x 20 Standing walk outs with yellow band x 1 min   Sitting ER with yellow band x 20       Reviewed x 2 min    With yellow band x 20  X 20   Scap retractions X 20 in sitting X 20 in sitting Standing with green band rows x 20 Pivot prone x 20     X 10     X 10  X 10 X 10 2 x 10   Single knee to chest 3 x 30 sec B                   3 x 30 sec B 3 x 30 sec B  3 x 30 sec B   Wall elevation   With towel and B UE x 20 With towel and B UE x 20 With towel and B UE x 20 With towel and B UE x 20 circles as well today x 15 CW/CCW reviewed With towel and B UE x 20 circles as well today x 15 With towel and B UE x 20 circles as well today x 15   With ball today x 3 min       Band extension   Bilateral with red band x 20 Bilateral with red band x 20   Bilateral with red band x 20   Bilateral with red band x 20   reviewed Bilateral with red band x 20   Bilateral with red band x 20 Bilateral with red band x 20   Hamstring glides     X 10 B X 10 B       X 10 B      X 10 B  X 5   Lower trunk rotations     X 10 B X 10 B             X 10      Breathing training     X 5 min X 10 B                   UBE         3 min forward/3 min backward 3 min forward/3 min backward 3 min forward/3 min backward 3 min forward/3 min backward 3 min forward/3 min backward Not today       Pulleys           X 3 min           X 3 min X 3 min   ER                   Standing with yellow band x 20 with walk outs Supine B ER with yellow band x 20                                   Hamstring mobilizatoin                     Supine kick ups x 10 B      Hip adduction            Ball squeeze x 15 Ball squeeze x 15                           MANUAL THERAPY: (45 minutes): Joint mobilization and Soft tissue mobilization was utilized and necessary because of the patient's restricted joint motion and restricted motion of soft tissue.   -Supine mobilization to cervical paraspinals, sub-occipitals, upper traps and lateral right shoulder and left shoulder today  -Gentle traction mobilization for stenosis reduction  -PROM to cervical spine mobilization for side bending and rotation  -Right UE distraction with perturbation  -Passive ER with end range hold for facilitation  -Forward elevation to 90 deg with distraction  -Side lie scapular mobilization as well as pelvic mobility through anterior elevation and posterior depression  Sciatic nerve glide and axial elongation  -Hip piriformis stretch B  -Side lie mobilization to pelvis for posterior depression and anterior elevation activation  -Right hip flexor stretch and left glute and hamstring stretch in supine        Treatment/Session Summary:    Treatment Assessment:Patient shows some continued low back issues and nerve irritation.  Continue to work toward lumbar mobility     Communication/Consultation:  None today  Equipment provided today:  None  Recommendations/Intent for next treatment session: Next visit will focus on Lumbar and cervical mobility and shoulder stability.     Total Treatment Billable Duration:  55 minutes   Time In: 1000  Time Out: [address] Jon PT       Charge Capture  }Post Session Pain  MedBridge Portal  MD Guidelines  Scanned Media  Benefits  MyChart    Future Appointments   Date Time Provider Keith Jefferson   9/23/2022  2:00 PM Fidencio Escobedo PT [facility]   9/30/2022  9:00 AM [facility]

## 2022-09-30 ENCOUNTER — HOSPITAL ENCOUNTER (OUTPATIENT)
Dept: PHYSICAL THERAPY | Age: 77
Setting detail: RECURRING SERIES
Discharge: HOME | End: 2022-10-03
Payer: MEDICARE

## 2022-09-30 PROCEDURE — 97110 THERAPEUTIC EXERCISES: CPT

## 2022-09-30 PROCEDURE — 97140 MANUAL THERAPY 1/> REGIONS: CPT

## 2022-09-30 ASSESSMENT — PAIN SCALES - GENERAL: PAINLEVEL_OUTOF10: 3

## 2022-09-30 NOTE — PROGRESS NOTES
Marta Fletcher  : 1945  Primary: Medicare Part A [place] B  Secondary: [address] @ [facility] [address]  [place] [address] [address]  Phone: [phone]  Fax: [phone] No data recorded  No data recorded    PT Visit Info: Total # of Visits to Date: 21      OUTPATIENT PHYSICAL THERAPY:OP NOTE TYPE: Treatment Note 2022       Episode  }Appt Desk              Treatment Diagnosis: Radiculopathy, cervical region, M54.12  Radiculopathy, lumbar region, M54.16  Pain in joint, right shoulder, M25.511  Difficulty walking, not elsewhere classified, R26.2  Low back pain, M54.5  Medical/Referring Diagnosis:  Radiculopathy, lumbar region [M54.16]  Radiculopathy, cervical region [M54.12]  Referring Physician:  Robb Salcido*  MD Orders:  PT Eval and Treat   TREATMENT PLAN:  Effective Dates: 8/3/22 TO 11/3/22 (90 days). Frequency/Duration: 1 time every 2 weeks for 6 weeks then 1 time every 3-4 weeks for 6 weeks   Date of Onset:  No data recorded   Allergies:   Patient has no allergy information on record. Restrictions/Precautions:  No data recordedNo data recorded   Interventions Planned (Treatment may consist of any combination of the following):  Balance Exercise  Bed Mobility  Cold  Cryotherapy  Electrical Stimulation  Gait Training  Heat  Manual Therapy  Neuromuscular Re-education/Strengthening  Range of Motion (ROM)  Therapeutic Activites  Therapeutic Exercise/Strengthening  No data recorded   Subjective Comments:Patient reports that the back is not as bad with less cramps in the legs today. Initial:}    3/10Post Session:       1/10  Medications Last Reviewed:  2022  Updated Objective Findings: ER to 45 deg before pain. Forward elevation to 80 deg active and 115 deg passive R UE  Treatment   THERAPEUTIC EXERCISE: (15 minutes):  Exercises per grid below to improve mobility, strength, balance and coordination.   Required minimal visual, distraction  -Side lie scapular mobilization as well as pelvic mobility through anterior elevation and posterior depression  Sciatic nerve glide and axial elongation  -Hip piriformis stretch B  -Side lie mobilization to pelvis for posterior depression and anterior elevation activation  -Right hip flexor stretch and left glute and hamstring stretch in supine        Treatment/Session Summary:    Treatment Assessment:Patient shows some continued low back issues and nerve irritation. She had less today but still feels it more with standing and walking. Communication/Consultation:  None today  Equipment provided today:  None  Recommendations/Intent for next treatment session: Next visit will focus on Lumbar and cervical mobility and shoulder stability.     Total Treatment Billable Duration:  55 minutes   Time In: 0900  Time Out: [facility], PT       Charge Capture  }Post Session Pain  MedBridge Portal  MD Guidelines  Scanned Media  Benefits  MyChart    Future Appointments   Date Time Provider Keith Jefferson   10/7/2022  3:00 PM Modesto Clark, PT [facility]   10/12/2022  3:00 PM Modesto Clark PT [facility]   10/17/2022  9:00 AM Modesto Clark PT [facility]   10/25/2022 11:00 AM Modesto Clark, PT [facility]

## 2022-10-07 ENCOUNTER — HOSPITAL ENCOUNTER (OUTPATIENT)
Dept: PHYSICAL THERAPY | Age: 77
Setting detail: RECURRING SERIES
Discharge: HOME | End: 2022-10-10
Payer: MEDICARE

## 2022-10-07 PROCEDURE — 97140 MANUAL THERAPY 1/> REGIONS: CPT

## 2022-10-07 PROCEDURE — 97110 THERAPEUTIC EXERCISES: CPT

## 2022-10-07 ASSESSMENT — PAIN SCALES - GENERAL: PAINLEVEL_OUTOF10: 4

## 2022-10-07 NOTE — PROGRESS NOTES
minutes):  Exercises per grid below to improve mobility, strength, balance and coordination. Required minimal visual, verbal and manual cues to promote proper body alignment, promote proper body posture, promote proper body mechanics and promote proper body breathing techniques. Progressed resistance, range and complexity of movement as indicated.     Date:  2/7/22 Date:  4/6/22 Date:  6/8/22 Date:  8/3/22 Date:  8/17/22 Date:  9/7/22 Date:  9/30/22 Date:  10/7/22   Activity/Exercise             Axial elongation 10 x 10 sec hold with need cues for technique 10 x 10 sec hold with need cues for technique 10 x 10 sec hold 10 x 10 sec hold X 10   10 x 10 sec hold with need cues for technique   Wand ER          Wand elevation x 15   Supine ER Reviewed x 2 min    With yellow band x 20  X 20     Scap retractions   X 10  X 10 X 10 2 x 10  X 20   Single knee to chest     3 x 30 sec B 3 x 30 sec B  3 x 30 sec B  3 x 30 sec B   Wall elevation   With ball today x 3 min         Band extension reviewed Bilateral with red band x 20   Bilateral with red band x 20 Bilateral with red band x 20 Bilateral with red band x 20    Hamstring glides      X 10 B  X 5  X 10 B   Lower trunk rotations     X 10        Breathing training          X 3 min   UBE 3 min forward/3 min backward Not today         Pulleys       X 3 min X 3 min X 3 min    ER   Standing with yellow band x 20 with walk outs Supine B ER with yellow band x 20                       Hamstring mobilizatoin     Supine kick ups x 10 B    Supine kick ups x 10 B Supine kick ups x 10 B   Hip adduction    Ball squeeze x 15 Ball squeeze x 15  Ball squeeze x 15                     MANUAL THERAPY: (45 minutes): Joint mobilization and Soft tissue mobilization was utilized and necessary because of the patient's restricted joint motion and restricted motion of soft tissue.   -Supine mobilization to cervical paraspinals, sub-occipitals, upper traps and lateral right shoulder and left shoulder today  -Gentle traction mobilization for stenosis reduction  -PROM to cervical spine mobilization for side bending and rotation  -Right UE distraction with perturbation  -Passive ER with end range hold for facilitation  -Forward elevation to 90 deg with distraction  -Side lie scapular mobilization as well as pelvic mobility through anterior elevation and posterior depression  Sciatic nerve glide and axial elongation  -Hip piriformis stretch B  -Side lie mobilization to pelvis for posterior depression and anterior elevation activation  -Right hip flexor stretch and left glute and hamstring stretch in supine        Treatment/Session Summary:    Treatment Assessment:Patient shows improvement in pelvic and hip mobility today and some improvement in shoulder relaxation. Communication/Consultation:  None today  Equipment provided today:  None  Recommendations/Intent for next treatment session: Next visit will focus on Lumbar and cervical mobility and shoulder stability.     Total Treatment Billable Duration:  55 minutes   Time In: 3217  Time Out: [address], PT       Charge Capture  }Post Session Pain  MedBridge Portal  MD Guidelines  Scanned Media  Benefits  MyChart    Future Appointments   Date Time Provider Keith Jefferson   10/7/2022  3:00 PM Carol Ann Gallardo, PT [facility]   10/12/2022  3:00 PM [facility]   10/17/2022  9:00 AM Carol Ann Gallardo, PT [facility]   10/25/2022 11:00 AM Carol Ann Gallardo, PT [facility]

## 2022-10-12 ENCOUNTER — HOSPITAL ENCOUNTER (OUTPATIENT)
Dept: PHYSICAL THERAPY | Age: 77
Setting detail: RECURRING SERIES
Discharge: HOME | End: 2022-10-15
Payer: MEDICARE

## 2022-10-12 PROCEDURE — 97140 MANUAL THERAPY 1/> REGIONS: CPT

## 2022-10-12 PROCEDURE — 97110 THERAPEUTIC EXERCISES: CPT

## 2022-10-12 ASSESSMENT — PAIN SCALES - GENERAL: PAINLEVEL_OUTOF10: 5

## 2022-10-12 NOTE — PROGRESS NOTES
Bianka Lewis  : 1945  Primary: Medicare Part A And B  Secondary: [address]  Jason Brown [address] [address]  Phone: [phone]  Fax: [phone] No data recorded  No data recorded    PT Visit Info: Total # of Visits to Date: 25      OUTPATIENT PHYSICAL THERAPY:OP NOTE TYPE: Treatment Note 10/12/2022       Episode  }Appt Desk              Treatment Diagnosis: Radiculopathy, cervical region, M54.12  Radiculopathy, lumbar region, M54.16  Pain in joint, right shoulder, M25.511  Difficulty walking, not elsewhere classified, R26.2  Low back pain, M54.5  Medical/Referring Diagnosis:  Radiculopathy, lumbar region [M54.16]  Radiculopathy, cervical region [M54.12]  Referring Physician:  Antonia Calle MD Orders:  PT Eval and Treat   TREATMENT PLAN:  Effective Dates: 8/3/22 TO 11/3/22 (90 days). Frequency/Duration: 1 time every 2 weeks for 6 weeks then 1 time every 3-4 weeks for 6 weeks   Date of Onset:  No data recorded   Allergies:   Patient has no allergy information on record. Restrictions/Precautions:  No data recordedNo data recorded   Interventions Planned (Treatment may consist of any combination of the following):  Balance Exercise  Bed Mobility  Cold  Cryotherapy  Electrical Stimulation  Gait Training  Heat  Manual Therapy  Neuromuscular Re-education/Strengthening  Range of Motion (ROM)  Therapeutic Activites  Therapeutic Exercise/Strengthening  No data recorded   Subjective Comments:Patient reports that the back is about the same and still feels tight and sore down the back of them     Initial:}    5/10Post Session:       2/10  Medications Last Reviewed:  10/12/2022  Updated Objective Findings: ER to 45 deg before pain. Forward elevation to 80 deg active and 115 deg passive R UE  Treatment   THERAPEUTIC EXERCISE: (10 minutes):  Exercises per grid below to improve mobility, strength, balance and coordination. Required minimal visual, verbal and manual cues to promote proper body alignment, promote proper body posture, promote proper body mechanics and promote proper body breathing techniques. Progressed resistance, range and complexity of movement as indicated.     Date:  2/7/22 Date:  4/6/22 Date:  6/8/22 Date:  8/3/22 Date:  8/17/22 Date:  9/7/22 Date:  9/30/22 Date:  10/7/22 Date:  10/12/22   Activity/Exercise              Axial elongation 10 x 10 sec hold with need cues for technique 10 x 10 sec hold with need cues for technique 10 x 10 sec hold 10 x 10 sec hold X 10   10 x 10 sec hold with need cues for technique 10 x 10 sec hold with need cues for technique   Wand ER          Wand elevation x 15    Supine ER Reviewed x 2 min    With yellow band x 20  X 20      Scap retractions   X 10  X 10 X 10 2 x 10  X 20 X 20   Single knee to chest     3 x 30 sec B 3 x 30 sec B  3 x 30 sec B  3 x 30 sec B 3 x 30 sec B   Wall elevation   With ball today x 3 min          Band extension reviewed Bilateral with red band x 20   Bilateral with red band x 20 Bilateral with red band x 20 Bilateral with red band x 20  Bilateral with red band x 20   Hamstring glides      X 10 B  X 5  X 10 B X 10 B   Lower trunk rotations     X 10         Breathing training          X 3 min    UBE 3 min forward/3 min backward Not today          Pulleys       X 3 min X 3 min X 3 min     ER   Standing with yellow band x 20 with walk outs Supine B ER with yellow band x 20                         Hamstring mobilizatoin     Supine kick ups x 10 B    Supine kick ups x 10 B Supine kick ups x 10 B    Hip adduction    Ball squeeze x 15 Ball squeeze x 15  Ball squeeze x 15  Seated ball squeeze and seated hip flexion march x 20 each   Core         Double leg push with assistance 3 x 30 sec hold         MANUAL THERAPY: (45 minutes): Joint mobilization and Soft tissue mobilization was utilized and necessary because of the patient's restricted joint motion and restricted motion of soft tissue.   -Supine mobilization to cervical paraspinals, sub-occipitals, upper traps and lateral right shoulder and left shoulder today  -Gentle traction mobilization for stenosis reduction  -PROM to cervical spine mobilization for side bending and rotation  -Right UE distraction with perturbation  -Passive ER with end range hold for facilitation  -Forward elevation to 90 deg with distraction  -Side lie scapular mobilization as well as pelvic mobility through anterior elevation and posterior depression  Sciatic nerve glide and axial elongation  -Hip piriformis stretch B  -Side lie mobilization to pelvis for posterior depression and anterior elevation activation  -Right hip flexor stretch and left glute and hamstring stretch in supine        Treatment/Session Summary:    Treatment Assessment:Patient shows improvement in pelvic and hip mobility at this time, with continued cramping and pain in the legs she is scheduled to visit the MD next week      Communication/Consultation:  None today  Equipment provided today:  None  Recommendations/Intent for next treatment session: Next visit will focus on Lumbar and cervical mobility and shoulder stability.     Total Treatment Billable Duration:  55 minutes   Time In: 1500  Time Out: [address], PT       Charge Capture  }Post Session Pain  MedBridge Portal  MD Guidelines  Scanned Media  Benefits  MyChart    Future Appointments   Date Time Provider Keith Jefferson   10/17/2022  9:00 AM Russell Read, [facility]   10/25/2022 11:00 AM [facility]

## 2022-10-17 ENCOUNTER — HOSPITAL ENCOUNTER (OUTPATIENT)
Dept: PHYSICAL THERAPY | Age: 77
Setting detail: RECURRING SERIES
Discharge: HOME | End: 2022-10-20
Payer: MEDICARE

## 2022-10-17 PROCEDURE — 97140 MANUAL THERAPY 1/> REGIONS: CPT

## 2022-10-17 PROCEDURE — 97110 THERAPEUTIC EXERCISES: CPT

## 2022-10-17 ASSESSMENT — PAIN SCALES - GENERAL: PAINLEVEL_OUTOF10: 4

## 2022-10-17 NOTE — PROGRESS NOTES
Holly Corrales  : 1945  Primary: Medicare Part [place] And B  Secondary: [address] @ [facility] [address]  Jason [address] [address]  Phone: [phone]  Fax: [phone] No data recorded  No data recorded    PT Visit Info: Total # of Visits to Date: 22      OUTPATIENT PHYSICAL THERAPY:OP NOTE TYPE: Treatment Note 10/17/2022       Episode  }Appt Desk              Treatment Diagnosis: Radiculopathy, cervical region, M54.12  Radiculopathy, lumbar region, M54.16  Pain in joint, right shoulder, M25.511  Difficulty walking, not elsewhere classified, R26.2  Low back pain, M54.5  Medical/Referring Diagnosis:  Radiculopathy, lumbar region [M54.16]  Radiculopathy, cervical region [M54.12]  Referring Physician:  Dodie Collins MD Orders:  PT Eval and Treat   TREATMENT PLAN:  Effective Dates: 8/3/22 TO 11/3/22 (90 days). Frequency/Duration: 1 time every 2 weeks for 6 weeks then 1 time every 3-4 weeks for 6 weeks   Date of Onset:  No data recorded   Allergies:   Patient has no allergy information on record. Restrictions/Precautions:  No data recordedNo data recorded   Interventions Planned (Treatment may consist of any combination of the following):  Balance Exercise  Bed Mobility  Cold  Cryotherapy  Electrical Stimulation  Gait Training  Heat  Manual Therapy  Neuromuscular Re-education/Strengthening  Range of Motion (ROM)  Therapeutic Activites  Therapeutic Exercise/Strengthening  No data recorded   Subjective Comments:Patient reports that the legs feel a little better today     Initial:}    4/10Post Session:       2/10  Medications Last Reviewed:  10/17/2022  Updated Objective Findings: ER to 45 deg before pain. Forward elevation to 80 deg active and 115 deg passive R UE  Treatment   THERAPEUTIC EXERCISE: (10 minutes):  Exercises per grid below to improve mobility, strength, balance and coordination.   Required minimal visual, verbal and manual cues to promote proper body alignment, promote proper body posture, promote proper body mechanics and promote proper body breathing techniques. Progressed resistance, range and complexity of movement as indicated.     Date:  2/7/22 Date:  4/6/22 Date:  6/8/22 Date:  8/3/22 Date:  8/17/22 Date:  9/7/22 Date:  9/30/22 Date:  10/7/22 Date:  10/12/22 Date:  10/17/22   Activity/Exercise               Axial elongation 10 x 10 sec hold with need cues for technique 10 x 10 sec hold with need cues for technique 10 x 10 sec hold 10 x 10 sec hold X 10   10 x 10 sec hold with need cues for technique 10 x 10 sec hold with need cues for technique    Wand ER          Wand elevation x 15  Wand elevation x 15   Supine ER Reviewed x 2 min    With yellow band x 20  X 20       Scap retractions   X 10  X 10 X 10 2 x 10  X 20 X 20 X 20   Single knee to chest     3 x 30 sec B 3 x 30 sec B  3 x 30 sec B  3 x 30 sec B 3 x 30 sec B    Wall elevation   With ball today x 3 min           Band extension reviewed Bilateral with red band x 20   Bilateral with red band x 20 Bilateral with red band x 20 Bilateral with red band x 20  Bilateral with red band x 20 Bilateral with red band x 20   Hamstring glides      X 10 B  X 5  X 10 B X 10 B    Lower trunk rotations     X 10       X 10 B   Breathing training          X 3 min     UBE 3 min forward/3 min backward Not today           Pulleys       X 3 min X 3 min X 3 min      ER   Standing with yellow band x 20 with walk outs Supine B ER with yellow band x 20                           Hamstring mobilizatoin     Supine kick ups x 10 B    Supine kick ups x 10 B Supine kick ups x 10 B     Hip adduction    Ball squeeze x 15 Ball squeeze x 15  Ball squeeze x 15  Seated ball squeeze and seated hip flexion march x 20 each Seated ball squeeze and seated hip flexion march x 20 each  Hip abduction into blue band x 20   Core         Double leg push with assistance 3 x 30 sec hold          MANUAL THERAPY: (45 minutes): Joint mobilization and Soft tissue mobilization was utilized and necessary because of the patient's restricted joint motion and restricted motion of soft tissue.   -Supine mobilization to cervical paraspinals, sub-occipitals, upper traps and lateral right shoulder and left shoulder today  -Gentle traction mobilization for stenosis reduction  -PROM to cervical spine mobilization for side bending and rotation  -Right UE distraction with perturbation  -Passive ER with end range hold for facilitation  -Forward elevation to 90 deg with distraction  -Side lie scapular mobilization as well as pelvic mobility through anterior elevation and posterior depression  Sciatic nerve glide and axial elongation  -Hip piriformis stretch B  -Side lie mobilization to pelvis for posterior depression and anterior elevation activation  -Right hip flexor stretch and left glute and hamstring stretch in supine        Treatment/Session Summary:    Treatment Assessment:Patient shows improvement in pelvic and hip mobility at this time. She shows less symptoms with work to hamstrings today     Communication/Consultation:  None today  Equipment provided today:  None  Recommendations/Intent for next treatment session: Next visit will focus on Lumbar and cervical mobility and shoulder stability.     Total Treatment Billable Duration:  55 minutes   Time In: 0900  Time Out: 111 [facility], PT       Charge Capture  }Post Session Pain  MedBridge Portal  MD Guidelines  Scanned Media  Benefits  MyChart    Future Appointments   Date Time Provider Keith Jefferson   10/25/2022 11:00 AM [facility]

## 2022-10-25 ENCOUNTER — HOSPITAL ENCOUNTER (OUTPATIENT)
Dept: PHYSICAL THERAPY | Age: 77
Setting detail: RECURRING SERIES
Discharge: HOME | End: 2022-10-28
Payer: MEDICARE

## 2022-10-25 PROCEDURE — 97110 THERAPEUTIC EXERCISES: CPT

## 2022-10-25 PROCEDURE — 97140 MANUAL THERAPY 1/> REGIONS: CPT

## 2022-10-25 ASSESSMENT — PAIN SCALES - GENERAL: PAINLEVEL_OUTOF10: 2

## 2022-10-25 NOTE — PROGRESS NOTES
Florentino Pablo  : 1945  Primary: Medicare Part A [place] B  Secondary: [address] @ [facility] [address]  Jason Brown [address] [address]  Phone: [phone]  Fax: [phone] No data recorded  No data recorded    PT Visit Info: Total # of Visits to Date: 32      OUTPATIENT PHYSICAL THERAPY:OP NOTE TYPE: Treatment Note 10/25/2022       Episode  }Appt Desk              Treatment Diagnosis: Radiculopathy, cervical region, M54.12  Radiculopathy, lumbar region, M54.16  Pain in joint, right shoulder, M25.511  Difficulty walking, not elsewhere classified, R26.2  Low back pain, M54.5  Medical/Referring Diagnosis:  Radiculopathy, lumbar region [M54.16]  Radiculopathy, cervical region [M54.12]  Referring Physician:  Debbie Jenkins MD Orders:  PT Eval and Treat   TREATMENT PLAN:  Effective Dates: 10/25/22 TO 23 (90 days). Frequency/Duration: 1 time every 2 weeks for 6 weeks then 1 time every 3-4 weeks for 6 weeks  Date of Onset:  No data recorded   Allergies:   Patient has no allergy information on record. Restrictions/Precautions:  No data recordedNo data recorded   Interventions Planned (Treatment may consist of any combination of the following):  Balance Exercise  Bed Mobility  Cold  Cryotherapy  Electrical Stimulation  Gait Training  Heat  Manual Therapy  Neuromuscular Re-education/Strengthening  Range of Motion (ROM)  Therapeutic Activites  Therapeutic Exercise/Strengthening  No data recorded   Subjective Comments:Patient reports that the legs feel a little better today and had a steroid dose pack last week that seems to have helped     Initial:}    2/10Post Session:       1/10  Medications Last Reviewed:  10/25/2022  Updated Objective Findings: ER to 45 deg before pain.   Forward elevation to 80 deg active and 115 deg passive R UE  Treatment   THERAPEUTIC EXERCISE: (10 minutes):  Exercises per grid below to improve mobility, strength, balance and coordination. Required minimal visual, verbal and manual cues to promote proper body alignment, promote proper body posture, promote proper body mechanics and promote proper body breathing techniques. Progressed resistance, range and complexity of movement as indicated.     Date:  2/7/22 Date:  4/6/22 Date:  6/8/22 Date:  8/3/22 Date:  8/17/22 Date:  9/7/22 Date:  9/30/22 Date:  10/7/22 Date:  10/12/22 Date:  10/17/22 Date:  10/25/22   Activity/Exercise                Axial elongation 10 x 10 sec hold with need cues for technique 10 x 10 sec hold with need cues for technique 10 x 10 sec hold 10 x 10 sec hold X 10   10 x 10 sec hold with need cues for technique 10 x 10 sec hold with need cues for technique  10 x 10 sec hold with need cues for technique   Wand ER          Wand elevation x 15  Wand elevation x 15    Supine ER Reviewed x 2 min    With yellow band x 20  X 20        Scap retractions   X 10  X 10 X 10 2 x 10  X 20 X 20 X 20 X 20   Single knee to chest     3 x 30 sec B 3 x 30 sec B  3 x 30 sec B  3 x 30 sec B 3 x 30 sec B  3 x 30 sec B   Wall elevation   With ball today x 3 min            Band extension reviewed Bilateral with red band x 20   Bilateral with red band x 20 Bilateral with red band x 20 Bilateral with red band x 20  Bilateral with red band x 20 Bilateral with red band x 20    Hamstring glides      X 10 B  X 5  X 10 B X 10 B  X 5 B   Lower trunk rotations     X 10       X 10 B X 10 B   Breathing training          X 3 min      UBE 3 min forward/3 min backward Not today            Pulleys       X 3 min X 3 min X 3 min       ER   Standing with yellow band x 20 with walk outs Supine B ER with yellow band x 20                             Hamstring mobilizatoin     Supine kick ups x 10 B    Supine kick ups x 10 B Supine kick ups x 10 B      Hip adduction    Ball squeeze x 15 Ball squeeze x 15  Ball squeeze x 15  Seated ball squeeze and seated hip flexion march x 20 each Seated ball squeeze and seated hip flexion march x 20 each  Hip abduction into blue band x 20    Core         Double leg push with assistance 3 x 30 sec hold  Double leg push with assistance 3 x 30 sec hold         MANUAL THERAPY: (45 minutes): Joint mobilization and Soft tissue mobilization was utilized and necessary because of the patient's restricted joint motion and restricted motion of soft tissue.   -Supine mobilization to cervical paraspinals, sub-occipitals, upper traps and lateral right shoulder and left shoulder today  -Gentle traction mobilization for stenosis reduction  -PROM to cervical spine mobilization for side bending and rotation  -Right UE distraction with perturbation  -Passive ER with end range hold for facilitation  -Forward elevation to 90 deg with distraction  -Side lie scapular mobilization as well as pelvic mobility through anterior elevation and posterior depression  Sciatic nerve glide and axial elongation  -Hip piriformis stretch B  -Side lie mobilization to pelvis for posterior depression and anterior elevation activation  -Right hip flexor stretch and left glute and hamstring stretch in supine        Treatment/Session Summary:    Treatment Assessment:Patient shows improvement in pelvic and hip mobility at this time. Follow up in two weeks after patient revisit with spine doctor. Communication/Consultation:  None today  Equipment provided today:  None  Recommendations/Intent for next treatment session: Next visit will focus on Lumbar and cervical mobility and shoulder stability.     Total Treatment Billable Duration:  55 minutes   Time In: 1100  Time Out: 57 Tamiko Suarez PT       Charge Capture  }Post Session Pain  MedBridge Portal  MD Guidelines  Scanned Media  Benefits  MyChart    Future Appointments   Date Time Provider Keith Jefferson   11/9/2022  3:00 PM [facility]
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
## 2022-12-08 ENCOUNTER — HOSPITAL ENCOUNTER (OUTPATIENT)
Dept: PHYSICAL THERAPY | Age: 77
Setting detail: RECURRING SERIES
Discharge: HOME | End: 2022-12-11
Payer: MEDICARE

## 2022-12-08 PROCEDURE — 97110 THERAPEUTIC EXERCISES: CPT

## 2022-12-08 PROCEDURE — 97140 MANUAL THERAPY 1/> REGIONS: CPT

## 2022-12-08 ASSESSMENT — PAIN SCALES - GENERAL: PAINLEVEL_OUTOF10: 6

## 2022-12-08 NOTE — PROGRESS NOTES
Sean Dye  : 1945  Primary: Medicare Part A And B  Secondary: [address] @ [facility] [address]  Jason Brown [address] [address]  Phone: [phone]  Fax: [phone] No data recorded  No data recorded    PT Visit Info: Total # of Visits to Date: 32      OUTPATIENT PHYSICAL THERAPY:OP NOTE TYPE: Treatment Note 2022       Episode  }Appt Desk              Treatment Diagnosis: Radiculopathy, cervical region, M54.12  Radiculopathy, lumbar region, M54.16  Pain in joint, right shoulder, M25.511  Difficulty walking, not elsewhere classified, R26.2  Low back pain, M54.5  Medical/Referring Diagnosis:  Radiculopathy, lumbar region [M54.16]  Radiculopathy, cervical region [M54.12]  Referring Physician:  Angel Sahu MD Orders:  PT Eval and Treat   TREATMENT PLAN:  Effective Dates: 10/25/22 TO 23 (90 days). Frequency/Duration: 1 time every 2 weeks for 6 weeks then 1 time every 3-4 weeks for 6 weeks  Date of Onset:  No data recorded   Allergies:   Patient has no allergy information on record. Restrictions/Precautions:  No data recordedNo data recorded   Interventions Planned (Treatment may consist of any combination of the following):  Balance Exercise  Bed Mobility  Cold  Cryotherapy  Electrical Stimulation  Gait Training  Heat  Manual Therapy  Neuromuscular Re-education/Strengthening  Range of Motion (ROM)  Therapeutic Activites  Therapeutic Exercise/Strengthening  No data recorded   Subjective Comments:Patient reports that she had a difficult time over the last month due to a bad sickness after her trip to [facility]. She had possibly pneumonia and the flu that set her back a lot. She feels a deep achy pain in the back and down the left leg more.  She reports more out of breath and difficulty with her regular activity     Initial:}    6/10Post Session:       3/10  Medications Last Reviewed:  2022  Updated Objective Findings: ER to 45 deg before pain. Forward elevation to 80 deg active and 115 deg passive R UE  Treatment   THERAPEUTIC EXERCISE: (10 minutes):  Exercises per grid below to improve mobility, strength, balance and coordination. Required minimal visual, verbal and manual cues to promote proper body alignment, promote proper body posture, promote proper body mechanics and promote proper body breathing techniques. Progressed resistance, range and complexity of movement as indicated.     Date:  9/30/22 Date:  10/7/22 Date:  10/12/22 Date:  10/17/22 Date:  10/25/22 Date:12/8/22   Activity/Exercise         Axial elongation  10 x 10 sec hold with need cues for technique 10 x 10 sec hold with need cues for technique  10 x 10 sec hold with need cues for technique    Wand ER  Wand elevation x 15  Wand elevation x 15     Supine ER         Scap retractions  X 20 X 20 X 20 X 20    Single knee to chest  3 x 30 sec B 3 x 30 sec B  3 x 30 sec B    Wall elevation         Band extension Bilateral with red band x 20  Bilateral with red band x 20 Bilateral with red band x 20     Hamstring glides  X 10 B X 10 B  X 5 B    Lower trunk rotations    X 10 B X 10 B    Breathing training  X 3 min    Review of breathing in different positions   UBE      LE x 8 min for mobility   Pulleys X 3 min        ER                    Hamstring mobilizatoin Supine kick ups x 10 B Supine kick ups x 10 B       Hip adduction Ball squeeze x 15  Seated ball squeeze and seated hip flexion march x 20 each Seated ball squeeze and seated hip flexion march x 20 each  Hip abduction into blue band x 20     Core   Double leg push with assistance 3 x 30 sec hold  Double leg push with assistance 3 x 30 sec hold Breathing with side lie hip flexion isometric         MANUAL THERAPY: (45 minutes): Joint mobilization and Soft tissue mobilization was utilized and necessary because of the patient's restricted joint motion and restricted motion of soft tissue.   -Supine mobilization to cervical paraspinals, sub-occipitals, upper traps and lateral right shoulder and left shoulder (not today)  -Gentle traction mobilization for stenosis reduction(not today)  -PROM to cervical spine mobilization for side bending and rotation(not today)  -Right UE distraction with perturbation(not today)  -Passive ER with end range hold for facilitation(not today)  -Forward elevation to 90 deg with distraction  -Side lie scapular mobilization as well as pelvic mobility through anterior elevation and posterior depression  Sciatic nerve glide and axial elongation  -Hip piriformis stretch B  -Side lie mobilization to pelvis for posterior depression and anterior elevation activation  -Right hip flexor stretch and left glute and hamstring stretch in supine        Treatment/Session Summary:    Treatment Assessment:Patient shows improvement in pelvic and hip mobility at this time. She still shows increased stiffness in her hips and we will continue to work on return to mobility     Communication/Consultation:  None today  Equipment provided today:  None  Recommendations/Intent for next treatment session: Next visit will focus on Lumbar and cervical mobility and shoulder stability.     Total Treatment Billable Duration:  55 minutes   Time In: 7940  Time Out: [address], PT       Charge Capture  }Post Session Pain  MedBridge Portal  MD Guidelines  Scanned Media  Benefits  MyChart    Future Appointments   Date Time Provider Keith Jefferson   12/12/2022  1:00 PM Kapil Puga PT [facility]   12/21/2022  9:00 AM [facility]

## 2022-12-12 ENCOUNTER — HOSPITAL ENCOUNTER (OUTPATIENT)
Dept: PHYSICAL THERAPY | Age: 77
Setting detail: RECURRING SERIES
Discharge: HOME | End: 2022-12-15
Payer: MEDICARE

## 2022-12-12 PROCEDURE — 97140 MANUAL THERAPY 1/> REGIONS: CPT

## 2022-12-12 PROCEDURE — 97110 THERAPEUTIC EXERCISES: CPT

## 2022-12-12 ASSESSMENT — PAIN SCALES - GENERAL: PAINLEVEL_OUTOF10: 4

## 2022-12-12 NOTE — PROGRESS NOTES
Sean Dye  : 1945  Primary: Medicare Part A And B  Secondary: [address] @ [facility] [address]  Jason Brown [address] [address]  Phone: [phone]  Fax: [phone] No data recorded  No data recorded    PT Visit Info: Total # of Visits to Date: 34      OUTPATIENT PHYSICAL THERAPY:OP NOTE TYPE: Treatment Note 2022       Episode  }Appt Desk              Treatment Diagnosis: Radiculopathy, cervical region, M54.12  Radiculopathy, lumbar region, M54.16  Pain in joint, right shoulder, M25.511  Difficulty walking, not elsewhere classified, R26.2  Low back pain, M54.5  Medical/Referring Diagnosis:  Radiculopathy, lumbar region [M54.16]  Radiculopathy, cervical region [M54.12]  Referring Physician:  Angel Sahu MD Orders:  PT Eval and Treat   TREATMENT PLAN:  Effective Dates: 10/25/22 TO 23 (90 days). Frequency/Duration: 1 time every 2 weeks for 6 weeks then 1 time every 3-4 weeks for 6 weeks  Date of Onset:  No data recorded   Allergies:   Patient has no allergy information on record. Restrictions/Precautions:  No data recordedNo data recorded   Interventions Planned (Treatment may consist of any combination of the following):  Balance Exercise  Bed Mobility  Cold  Cryotherapy  Electrical Stimulation  Gait Training  Heat  Manual Therapy  Neuromuscular Re-education/Strengthening  Range of Motion (ROM)  Therapeutic Activites  Therapeutic Exercise/Strengthening  No data recorded   Subjective Comments:Patient reports that she feels a little better today than last time, still with some leg pain and still tires easily     Initial:}    4/10Post Session:       2/10  Medications Last Reviewed:  2022  Updated Objective Findings: IT band tightness B  Treatment   THERAPEUTIC EXERCISE: (10 minutes):  Exercises per grid below to improve mobility, strength, balance and coordination.   Required minimal visual, verbal and manual cues to promote proper body alignment, promote proper body posture, promote proper body mechanics and promote proper body breathing techniques. Progressed resistance, range and complexity of movement as indicated.     Date:  9/30/22 Date:  10/7/22 Date:  10/12/22 Date:  10/17/22 Date:  10/25/22 Date:12/8/22 Date:  12/12/22   Activity/Exercise          Axial elongation  10 x 10 sec hold with need cues for technique 10 x 10 sec hold with need cues for technique  10 x 10 sec hold with need cues for technique     Wand ER  Wand elevation x 15  Wand elevation x 15      Supine ER          Scap retractions  X 20 X 20 X 20 X 20     Single knee to chest  3 x 30 sec B 3 x 30 sec B  3 x 30 sec B     Wall elevation          Band extension Bilateral with red band x 20  Bilateral with red band x 20 Bilateral with red band x 20      Hamstring glides  X 10 B X 10 B  X 5 B     Lower trunk rotations    X 10 B X 10 B     Breathing training  X 3 min    Review of breathing in different positions    UBE      LE x 8 min for mobility LE x 8 min for mobility   Pulleys X 3 min         ER                      Hamstring mobilizatoin Supine kick ups x 10 B Supine kick ups x 10 B        Hip adduction Ball squeeze x 15  Seated ball squeeze and seated hip flexion march x 20 each Seated ball squeeze and seated hip flexion march x 20 each  Hip abduction into blue band x 20   Seated ball squeeze and seated hip flexion march x 20 each  Hip abduction into blue band x 20   Core   Double leg push with assistance 3 x 30 sec hold  Double leg push with assistance 3 x 30 sec hold Breathing with side lie hip flexion isometric Breathing with side lie hip flexion isometric         MANUAL THERAPY: (45 minutes): Joint mobilization and Soft tissue mobilization was utilized and necessary because of the patient's restricted joint motion and restricted motion of soft tissue.   -Supine mobilization to cervical paraspinals, sub-occipitals, upper traps and lateral right shoulder and left shoulder (not today)  -Gentle traction mobilization for stenosis reduction(not today)  -PROM to cervical spine mobilization for side bending and rotation(not today)  -Right UE distraction with perturbation(not today)  -Passive ER with end range hold for facilitation(not today)  -Forward elevation to 90 deg with distraction  -Side lie scapular mobilization as well as pelvic mobility through anterior elevation and posterior depression  Sciatic nerve glide and axial elongation  -Hip piriformis stretch B  -Side lie mobilization to pelvis for posterior depression and anterior elevation activation  -Right hip flexor stretch and left glute and hamstring stretch in supine        Treatment/Session Summary:    Treatment Assessment:Patient shows improvement today in pelvic mobility and hamstring mobility. Communication/Consultation:  None today  Equipment provided today:  None  Recommendations/Intent for next treatment session: Next visit will focus on Lumbar and cervical mobility and shoulder stability.     Total Treatment Billable Duration:  55 minutes   Time In: 1300  Time Out: KITA Pike       Charge Capture  }Post Session Pain  MedBridge Portal  MD Guidelines  Scanned Media  Benefits  MyChart    Future Appointments   Date Time Provider Keith Jefferson   12/21/2022  9:00 AM Julito Brody PT [facility]

## 2022-12-21 ENCOUNTER — HOSPITAL ENCOUNTER (OUTPATIENT)
Dept: PHYSICAL THERAPY | Age: 77
Setting detail: RECURRING SERIES
Discharge: HOME | End: 2022-12-24
Payer: MEDICARE

## 2022-12-21 PROCEDURE — 97140 MANUAL THERAPY 1/> REGIONS: CPT

## 2022-12-21 PROCEDURE — 97110 THERAPEUTIC EXERCISES: CPT

## 2022-12-21 ASSESSMENT — PAIN SCALES - GENERAL: PAINLEVEL_OUTOF10: 4

## 2022-12-21 NOTE — PROGRESS NOTES
Cony Pages  : 1945  Primary: Medicare Part A And B  Secondary: [address] @ [facility] [place] [address]  Jason Brown [address] [address]  Phone: [phone]  Fax: [phone] No data recorded  No data recorded    PT Visit Info: Total # of Visits to Date: 27      OUTPATIENT PHYSICAL THERAPY:OP NOTE TYPE: Treatment Note 2022       Episode  }Appt Desk              Treatment Diagnosis: Radiculopathy, cervical region, M54.12  Radiculopathy, lumbar region, M54.16  Pain in joint, right shoulder, M25.511  Difficulty walking, not elsewhere classified, R26.2  Low back pain, M54.5  Medical/Referring Diagnosis:  Radiculopathy, lumbar region [M54.16]  Radiculopathy, cervical region [M54.12]  Referring Physician:  Keyona Escamilla*  MD Orders:  PT Eval and Treat   TREATMENT PLAN:  Effective Dates: 10/25/22 TO 23 (90 days). Frequency/Duration: 1 time every 2 weeks for 6 weeks then 1 time every 3-4 weeks for 6 weeks  Date of Onset:  No data recorded   Allergies:   Patient has no allergy information on record.   Restrictions/Precautions:  No data recordedNo data recorded   Interventions Planned (Treatment may consist of any combination of the following):  Balance Exercise  Bed Mobility  Cold  Cryotherapy  Electrical Stimulation  Gait Training  Heat  Manual Therapy  Neuromuscular Re-education/Strengthening  Range of Motion (ROM)  Therapeutic Activites  Therapeutic Exercise/Strengthening  No data recorded   Subjective Comments:Patient reports that she feels the back still gives her fits and she talked to the doctor about the shoulder which he says needs replaced, but she is not sure she is ready for that yet     Initial:}    4/10Post Session:       2/10  Medications Last Reviewed:  2022  Updated Objective Findings: IT band tightness B  Treatment   THERAPEUTIC EXERCISE: (25 minutes):  Exercises per grid below to improve mobility, strength, balance and coordination. Required minimal visual, verbal and manual cues to promote proper body alignment, promote proper body posture, promote proper body mechanics and promote proper body breathing techniques. Progressed resistance, range and complexity of movement as indicated.     Date:  10/25/22 Date:12/8/22 Date:  12/12/22 Date:  12/21/22   Activity/Exercise       Axial elongation 10 x 10 sec hold with need cues for technique      Wand ER       Supine ER       Scap retractions X 20   X 20   Single knee to chest 3 x 30 sec B      Wall elevation       Band extension       Hamstring glides X 5 B      Lower trunk rotations X 10 B   X 10 B   Breathing training  Review of breathing in different positions  X 5 min   UBE  LE x 8 min for mobility LE x 8 min for mobility Nu Step machine x 8 min   Pulleys    X 3 min   ER                Hamstring mobilizatoin       Hip adduction   Seated ball squeeze and seated hip flexion march x 20 each  Hip abduction into blue band x 20 Seated ball squeeze and seated hip flexion march x 20 each  Hip abduction into blue band x 20   Core Double leg push with assistance 3 x 30 sec hold Breathing with side lie hip flexion isometric Breathing with side lie hip flexion isometric Push into ball 3 x 30 sec hold         MANUAL THERAPY: (30 minutes): Joint mobilization and Soft tissue mobilization was utilized and necessary because of the patient's restricted joint motion and restricted motion of soft tissue.   -Supine mobilization to cervical paraspinals, sub-occipitals, upper traps and lateral right shoulder and left shoulder (not today)  -Gentle traction mobilization for stenosis reduction(not today)  -PROM to cervical spine mobilization for side bending and rotation(not today)  -Right UE distraction with perturbation(not today)  -Passive ER with end range hold for facilitation(not today)  -Forward elevation to 90 deg with distraction  -Side lie scapular mobilization as well as pelvic mobility through anterior elevation and posterior depression  Sciatic nerve glide and axial elongation  -Hip piriformis stretch B  -Side lie mobilization to pelvis for posterior depression and anterior elevation activation  -Right hip flexor stretch and left glute and hamstring stretch in supine        Treatment/Session Summary:    Treatment Assessment:Patient shows improvement today lumbar mobility. Her shoulder still bothers her and she is deciding if she will have surgery in the future or not  Communication/Consultation:  None today  Equipment provided today:  None  Recommendations/Intent for next treatment session: Next visit will focus on Lumbar and cervical mobility and shoulder stability.     Total Treatment Billable Duration:  55 minutes   Time In: 0900  Time Out: [address], PT       Charge Capture  }Post Session Pain  MedBridge Portal  MD Guidelines  Scanned Media  Benefits  MyChart    Future Appointments   Date Time Provider Keith Jefferson   1/6/2023 11:00 AM Shayan Abdi, KITA [facility] [place] [facility]   1/18/2023  9:00 AM [facility]

## 2023-01-06 ENCOUNTER — HOSPITAL ENCOUNTER (OUTPATIENT)
Dept: PHYSICAL THERAPY | Age: 78
Setting detail: RECURRING SERIES
Discharge: HOME | End: 2023-01-09
Payer: MEDICARE

## 2023-01-06 PROCEDURE — 97110 THERAPEUTIC EXERCISES: CPT

## 2023-01-06 PROCEDURE — 97140 MANUAL THERAPY 1/> REGIONS: CPT

## 2023-01-06 ASSESSMENT — PAIN SCALES - GENERAL: PAINLEVEL_OUTOF10: 4

## 2023-01-06 NOTE — PROGRESS NOTES
Misty Lanza  : 1945  Primary: Medicare Part A [place] B  Secondary: [address] @ [facility] [address]  Jason Brown [address] [address]  Phone: [phone]  Fax: [phone] No data recorded  No data recorded    PT Visit Info: Total # of Visits to Date: 27      OUTPATIENT PHYSICAL THERAPY:OP NOTE TYPE: Treatment Note 2023       Episode  }Appt Desk              Treatment Diagnosis: Radiculopathy, cervical region, M54.12  Radiculopathy, lumbar region, M54.16  Pain in joint, right shoulder, M25.511  Difficulty walking, not elsewhere classified, R26.2  Low back pain, M54.5  Medical/Referring Diagnosis:  Radiculopathy, lumbar region [M54.16]  Radiculopathy, cervical region [M54.12]  Referring Physician:  Payton Dyer*  MD Orders:  PT Eval and Treat   TREATMENT PLAN:  Effective Dates: 10/25/22 TO 23 (90 days). Frequency/Duration: 1 time every 2 weeks for 6 weeks then 1 time every 3-4 weeks for 6 weeks  Date of Onset:  No data recorded   Allergies:   Patient has no allergy information on record. Restrictions/Precautions:  No data recordedNo data recorded   Interventions Planned (Treatment may consist of any combination of the following):  Balance Exercise  Bed Mobility  Cold  Cryotherapy  Electrical Stimulation  Gait Training  Heat  Manual Therapy  Neuromuscular Re-education/Strengthening  Range of Motion (ROM)  Therapeutic Activites  Therapeutic Exercise/Strengthening  No data recorded   Subjective Comments:Patient reports that the back and legs are doing better, but the shoulders are really hurting today     Initial:}    4/10Post Session:       2/10  Medications Last Reviewed:  2023  Updated Objective Findings: IT band tightness B  Treatment   THERAPEUTIC EXERCISE: (15 minutes):  Exercises per grid below to improve mobility, strength, balance and coordination.   Required minimal visual, verbal and manual cues to promote proper body alignment, promote proper body posture, promote proper body mechanics and promote proper body breathing techniques. Progressed resistance, range and complexity of movement as indicated.     Date:  10/25/22 Date:12/8/22 Date:  12/12/22 Date:  12/21/22 Date:  1/6/23   Activity/Exercise        Axial elongation 10 x 10 sec hold with need cues for technique       Wand ER     X 20   Supine ER        Scap retractions X 20   X 20 X 20   Single knee to chest 3 x 30 sec B       Wall elevation        Band extension        Hamstring glides X 5 B       Lower trunk rotations X 10 B   X 10 B X 10 B   Breathing training  Review of breathing in different positions  X 5 min    UBE  LE x 8 min for mobility LE x 8 min for mobility Nu Step machine x 8 min Nu Step machine x 10 min   Pulleys    X 3 min    ER                  Hamstring mobilizatoin        Hip adduction   Seated ball squeeze and seated hip flexion march x 20 each  Hip abduction into blue band x 20 Seated ball squeeze and seated hip flexion march x 20 each  Hip abduction into blue band x 20 Seated ball squeeze and seated hip flexion march x 20 each   Core Double leg push with assistance 3 x 30 sec hold Breathing with side lie hip flexion isometric Breathing with side lie hip flexion isometric Push into ball 3 x 30 sec hold Not today, it bothered her arm         MANUAL THERAPY: (40 minutes): Joint mobilization and Soft tissue mobilization was utilized and necessary because of the patient's restricted joint motion and restricted motion of soft tissue.   -Supine mobilization to cervical paraspinals, sub-occipitals, upper traps and lateral right shoulder and left shoulder (not today)  -Gentle traction mobilization for stenosis reduction(not today)  -PROM to cervical spine mobilization for side bending and rotation(not today)  -Right UE distraction with perturbation(not today)  -Passive ER with end range hold for facilitation(not today)  -Forward elevation to 90 deg with distraction  -Side lie scapular mobilization as well as pelvic mobility through anterior elevation and posterior depression  Sciatic nerve glide and axial elongation  -Hip piriformis stretch B  -Side lie mobilization to pelvis for posterior depression and anterior elevation activation  -Right hip flexor stretch and left glute and hamstring stretch in supine        Treatment/Session Summary:    Treatment Assessment:Patient shows improvement today lumbar mobility. Her shoulder still bothers her and she is deciding if she will have surgery in the future or not  Communication/Consultation:  None today  Equipment provided today:  None  Recommendations/Intent for next treatment session: Next visit will focus on Lumbar and cervical mobility and shoulder stability.     Total Treatment Billable Duration:  55 minutes   Time In: 1100  Time Out: 923 [facility], PT       Charge Capture  }Post Session Pain  MedBridge Portal  MD Guidelines  Scanned Media  Benefits  MyChart    Future Appointments   Date Time Provider Keith Jefferson   1/18/2023  9:00 AM Kapil Puga, PT [facility] [place] [facility]

## 2023-01-18 ENCOUNTER — HOSPITAL ENCOUNTER (OUTPATIENT)
Dept: PHYSICAL THERAPY | Age: 78
Setting detail: RECURRING SERIES
Discharge: HOME | End: 2023-01-21
Payer: MEDICARE

## 2023-01-18 PROCEDURE — 97110 THERAPEUTIC EXERCISES: CPT

## 2023-01-18 PROCEDURE — 97140 MANUAL THERAPY 1/> REGIONS: CPT

## 2023-01-18 ASSESSMENT — PAIN SCALES - GENERAL: PAINLEVEL_OUTOF10: 5
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
## 2023-01-18 NOTE — PROGRESS NOTES
Marissa Kimble  : 1945  Primary: Medicare Part A And B  Secondary: [address] @ [facility] [address]  Jason Brown [address] [address]  Phone: [phone]  Fax: [phone] No data recorded  No data recorded    PT Visit Info: Total # of Visits to Date: 27      OUTPATIENT PHYSICAL THERAPY:OP NOTE TYPE: Treatment Note 2023       Episode  }Appt Desk              Treatment Diagnosis: Radiculopathy, cervical region, M54.12  Radiculopathy, lumbar region, M54.16  Pain in joint, right shoulder, M25.511  Difficulty walking, not elsewhere classified, R26.2  Low back pain, M54.5  Medical/Referring Diagnosis:  Radiculopathy, lumbar region [M54.16]  Radiculopathy, cervical region [M54.12]  Referring Physician:  Kenji Blanco*  MD Orders:  PT Eval and Treat   TREATMENT PLAN:  Effective Dates: 23 TO 23 (90 days). Frequency/Duration: 1 time every 2 weeks for 6 weeks then 1 time every 3-4 weeks for 6 weeks   Date of Onset:  No data recorded   Allergies:   Patient has no allergy information on record. Restrictions/Precautions:  No data recordedNo data recorded   Interventions Planned (Treatment may consist of any combination of the following):  Balance Exercise  Bed Mobility  Cold  Cryotherapy  Electrical Stimulation  Gait Training  Heat  Manual Therapy  Neuromuscular Re-education/Strengthening  Range of Motion (ROM)  Therapeutic Activites  Therapeutic Exercise/Strengthening  No data recorded   Subjective Comments:Patient reports that the back is still doing well, but the shoulder still gives her issues     Initial:}    5/10Post Session:       3/10  Medications Last Reviewed:  2023  Updated Objective Findings: IT band tightness B  Treatment   THERAPEUTIC EXERCISE: (15 minutes):  Exercises per grid below to improve mobility, strength, balance and coordination.   Required minimal visual, verbal and manual cues to promote proper body alignment, promote proper body posture, promote proper body mechanics and promote proper body breathing techniques. Progressed resistance, range and complexity of movement as indicated.     Date:  10/25/22 Date:12/8/22 Date:  12/12/22 Date:  12/21/22 Date:  1/6/23 Date:  1/18/23   Activity/Exercise         Axial elongation 10 x 10 sec hold with need cues for technique        Wand ER     X 20    Supine ER         Scap retractions X 20   X 20 X 20 X 20   Single knee to chest 3 x 30 sec B        Wall elevation      X 20   Band extension         Hamstring glides X 5 B        Lower trunk rotations X 10 B   X 10 B X 10 B    Breathing training  Review of breathing in different positions  X 5 min     UBE  LE x 8 min for mobility LE x 8 min for mobility Nu Step machine x 8 min Nu Step machine x 10 min Nu Step machine x 5 min   Pulleys    X 3 min  X 3 min   ER                    Hamstring mobilizatoin         Hip adduction   Seated ball squeeze and seated hip flexion march x 20 each  Hip abduction into blue band x 20 Seated ball squeeze and seated hip flexion march x 20 each  Hip abduction into blue band x 20 Seated ball squeeze and seated hip flexion march x 20 each    Core Double leg push with assistance 3 x 30 sec hold Breathing with side lie hip flexion isometric Breathing with side lie hip flexion isometric Push into ball 3 x 30 sec hold Not today, it bothered her arm          MANUAL THERAPY: (30 minutes): Joint mobilization and Soft tissue mobilization was utilized and necessary because of the patient's restricted joint motion and restricted motion of soft tissue.   -Supine mobilization to cervical paraspinals, sub-occipitals, upper traps and lateral right shoulder and left shoulder (not today)  -Gentle traction mobilization for stenosis reduction(not today)  -PROM to cervical spine mobilization for side bending and rotation(not today)  -Right UE distraction with perturbation(not today)  -Passive ER with end range hold for facilitation(not today)  -Forward elevation to 90 deg with distraction  -Side lie scapular mobilization as well as pelvic mobility through anterior elevation and posterior depression  Sciatic nerve glide and axial elongation  -Hip piriformis stretch B  -Side lie mobilization to pelvis for posterior depression and anterior elevation activation  -Right hip flexor stretch and left glute and hamstring stretch in supine        Treatment/Session Summary:    Treatment Assessment:Patient shows continued difficulty with the R UE. We will continue to follow up with her due to continued pain and decreased function. Communication/Consultation:  None today  Equipment provided today:  None  Recommendations/Intent for next treatment session: Next visit will focus on Lumbar and cervical mobility and shoulder stability.     Total Treatment Billable Duration:  45 minutes   Time In: 0717  Time Out: 701 Nicanor Mendez, KITA       Charge Capture  }Post Session Pain  MedBridge Portal  MD Guidelines  Scanned Media  Benefits  MyChart    Future Appointments   Date Time Provider Keith Jefferson   2/2/2023 11:00 AM Trevor Ruibo, PT [facility]

## 2023-02-02 ENCOUNTER — HOSPITAL ENCOUNTER (OUTPATIENT)
Dept: PHYSICAL THERAPY | Age: 78
Setting detail: RECURRING SERIES
Discharge: HOME | End: 2023-02-05
Payer: MEDICARE

## 2023-02-02 PROCEDURE — 97140 MANUAL THERAPY 1/> REGIONS: CPT

## 2023-02-02 PROCEDURE — 97110 THERAPEUTIC EXERCISES: CPT

## 2023-02-02 ASSESSMENT — PAIN SCALES - GENERAL: PAINLEVEL_OUTOF10: 6

## 2023-02-02 NOTE — PROGRESS NOTES
Deepa Real  : 1945  Primary: Medicare Part [place] B  Secondary: [address] @ [facility] [address]  Jason Brown [address] [address]  Phone: [phone]  Fax: [phone] No data recorded  No data recorded    PT Visit Info: Total # of Visits to Date: 35      OUTPATIENT PHYSICAL THERAPY:OP NOTE TYPE: Treatment Note 2023       Episode  }Appt Desk              Treatment Diagnosis: Radiculopathy, cervical region, M54.12  Radiculopathy, lumbar region, M54.16  Pain in joint, right shoulder, M25.511  Difficulty walking, not elsewhere classified, R26.2  Low back pain, M54.5  Medical/Referring Diagnosis:  Radiculopathy, lumbar region [M54.16]  Radiculopathy, cervical region [M54.12]  Referring Physician:  Carlin Kapadia*  MD Orders:  PT Eval and Treat   TREATMENT PLAN:  Effective Dates: 23 TO 23 (90 days). Frequency/Duration: 1 time every 2 weeks for 6 weeks then 1 time every 3-4 weeks for 6 weeks   Date of Onset:  No data recorded   Allergies:   Patient has no allergy information on record. Restrictions/Precautions:  No data recordedNo data recorded   Interventions Planned (Treatment may consist of any combination of the following):  Balance Exercise  Bed Mobility  Cold  Cryotherapy  Electrical Stimulation  Gait Training  Heat  Manual Therapy  Neuromuscular Re-education/Strengthening  Range of Motion (ROM)  Therapeutic Activites  Therapeutic Exercise/Strengthening  No data recorded   Subjective Comments:Patient reports that her body this morning is aching all over and she is having more pain in both shoulders, back and neck today     Initial:}    6/10Post Session:       4/10  Medications Last Reviewed:  2023  Updated Objective Findings: IT band tightness B  Treatment   THERAPEUTIC EXERCISE: (10 minutes):  Exercises per grid below to improve mobility, strength, balance and coordination.   Required minimal visual, verbal and manual cues to promote proper body alignment, promote proper body posture, promote proper body mechanics and promote proper body breathing techniques. Progressed resistance, range and complexity of movement as indicated. Date:  1/6/23 Date:  1/18/23 Date:  2/2/23   Activity/Exercise      Axial elongation   10 x 5 sec hold   Wand ER X 20     Supine ER      Scap retractions X 20 X 20    Single knee to chest      Wall elevation  X 20    Band extension      Hamstring glides      Lower trunk rotations X 10 B     Breathing training   X 3 min   UBE Nu Step machine x 10 min Nu Step machine x 5 min Nu Step x 8 min L1   Pulleys  X 3 min    ER              Hamstring mobilizatoin      Hip adduction Seated ball squeeze and seated hip flexion march x 20 each     Core Not today, it bothered her arm           MANUAL THERAPY: (45 minutes): Joint mobilization and Soft tissue mobilization was utilized and necessary because of the patient's restricted joint motion and restricted motion of soft tissue.   -Supine mobilization to cervical paraspinals, sub-occipitals, upper traps and lateral right shoulder and left shoulder   -Gentle traction mobilization for stenosis reduction  -PROM to cervical spine mobilization for side bending and rotation  -Right UE distraction with perturbation  -Passive ER with end range hold for facilitation  -Forward elevation to 90 deg with distraction  -Side lie scapular mobilization as well as pelvic mobility through anterior elevation and posterior depression  Sciatic nerve glide and axial elongation  -Hip piriformis stretch B  -Side lie mobilization to pelvis for posterior depression and anterior elevation activation  -Right hip flexor stretch and left glute and hamstring stretch in supine        Treatment/Session Summary:    Treatment Assessment:Patient shows tightness and pain in back, neck and shoulders today.  Worked on gentle mobilizations and will follow up in 2-3 weeks to see how her progress is  Communication/Consultation:  None today  Equipment provided today:  None  Recommendations/Intent for next treatment session: Next visit will focus on Lumbar and cervical mobility and shoulder stability.     Total Treatment Billable Duration:  60 minutes   Time In: 1100  Time Out: 57 Tamiko Suarez, PT       Charge Capture  }Post Session Pain  MedBridge Portal  MD Guidelines  Scanned Media  Benefits  MyChart    Future Appointments   Date Time Provider Keith Jefferson   2/22/2023  3:00 PM Malini Brody PT [facility]

## 2023-02-22 ENCOUNTER — HOSPITAL ENCOUNTER (OUTPATIENT)
Dept: PHYSICAL THERAPY | Age: 78
Setting detail: RECURRING SERIES
Discharge: HOME | End: 2023-02-25
Payer: MEDICARE

## 2023-02-22 PROCEDURE — 97140 MANUAL THERAPY 1/> REGIONS: CPT

## 2023-02-22 PROCEDURE — 97110 THERAPEUTIC EXERCISES: CPT

## 2023-02-22 ASSESSMENT — PAIN SCALES - GENERAL: PAINLEVEL_OUTOF10: 7

## 2023-02-23 NOTE — PROGRESS NOTES
Bianka Lewis  : 1945  Primary: Medicare Part A And B  Secondary: [address] @ [employer]  [address] [place]  [place] [address]  Phone: [phone]  Fax: [phone] No data recorded  No data recorded    PT Visit Info: Total # of Visits to Date: 35      OUTPATIENT PHYSICAL THERAPY:OP NOTE TYPE: Treatment Note 2023       Episode  }Appt Desk              Treatment Diagnosis: Radiculopathy, cervical region, M54.12  Radiculopathy, lumbar region, M54.16  Pain in joint, right shoulder, M25.511  Difficulty walking, not elsewhere classified, R26.2  Low back pain, M54.5  Medical/Referring Diagnosis:  Radiculopathy, lumbar region [M54.16]  Radiculopathy, cervical region [M54.12]  Referring Physician:  Antonia Calle MD Orders:  PT Eval and Treat   TREATMENT PLAN:  Effective Dates: 23 TO 23 (90 days). Frequency/Duration: 1 time every 2 weeks for 6 weeks then 1 time every 3-4 weeks for 6 weeks   Date of Onset:  No data recorded   Allergies:   Patient has no allergy information on record. Restrictions/Precautions:  No data recordedNo data recorded   Interventions Planned (Treatment may consist of any combination of the following):  Balance Exercise  Bed Mobility  Cold  Cryotherapy  Electrical Stimulation  Gait Training  Heat  Manual Therapy  Neuromuscular Re-education/Strengthening  Range of Motion (ROM)  Therapeutic Activites  Therapeutic Exercise/Strengthening  No data recorded   Subjective Comments:Patient reports that her back is really achy and painful this week. She has been wearing a tight belt around her waist and that seems to help. She keeps doing her exercises, but for some reason the back is bothering her more.  She is also having more neck pain today     Initial:}    7/10Post Session:       4/10  Medications Last Reviewed:  2023  Updated Objective Findings: IT band tightness B  Treatment   THERAPEUTIC EXERCISE: (10 minutes):  Exercises per grid below to improve mobility, strength, balance and coordination. Required minimal visual, verbal and manual cues to promote proper body alignment, promote proper body posture, promote proper body mechanics and promote proper body breathing techniques. Progressed resistance, range and complexity of movement as indicated.     Date:  1/6/23 Date:  1/18/23 Date:  2/2/23 Date:  2/22/23   Activity/Exercise       Axial elongation   10 x 5 sec hold 10 x 5 sec hold   Wand ER X 20      Supine ER       Scap retractions X 20 X 20  X 20   Single knee to chest       Wall elevation  X 20     Band extension       Hamstring glides       Lower trunk rotations X 10 B      Breathing training   X 3 min    UBE Nu Step machine x 10 min Nu Step machine x 5 min Nu Step x 8 min L1 Nu Step x 8 min L1   Pulleys  X 3 min     ER        Hip abduction    Green band x 25    Hamstring mobilizatoin       Hip adduction Seated ball squeeze and seated hip flexion march x 20 each   Seated ball squeeze and seated hip flexion march x 20 each   Core Not today, it bothered her arm            MANUAL THERAPY: (45 minutes): Joint mobilization and Soft tissue mobilization was utilized and necessary because of the patient's restricted joint motion and restricted motion of soft tissue.   -Supine mobilization to cervical paraspinals, sub-occipitals, upper traps and lateral right shoulder and left shoulder   -Gentle traction mobilization for stenosis reduction  -PROM to cervical spine mobilization for side bending and rotation  -Right UE distraction with perturbation  -Passive ER with end range hold for facilitation  -Forward elevation to 90 deg with distraction  -Side lie scapular mobilization as well as pelvic mobility through anterior elevation and posterior depression  Sciatic nerve glide and axial elongation  -Hip piriformis stretch B  -Side lie mobilization to pelvis for posterior depression and anterior elevation activation B  -Right hip flexor stretch and left glute and hamstring stretch in supine        Treatment/Session Summary:    Treatment Assessment:Patient shows tightness and pain in back, neck and shoulders today. We mobilized her fascia and soft tissue restrictions. Education on her SI belt versus the regular belt for stability  Communication/Consultation:  None today  Equipment provided today:  None  Recommendations/Intent for next treatment session: Next visit will focus on Lumbar and cervical mobility and shoulder stability. Total Treatment Billable Duration:  60 minutes   Time In: 1500  Time Out: Trix [address], PT       Charge Capture  }Post Session Pain  MedBridge Portal  MD Guidelines  Scanned Media  Benefits  MyChart    No future appointments.

## 2023-02-27 ENCOUNTER — APPOINTMENT (OUTPATIENT)
Dept: PHYSICAL THERAPY | Age: 78
End: 2023-02-27
Payer: MEDICARE

## 2023-03-08 ENCOUNTER — HOSPITAL ENCOUNTER (OUTPATIENT)
Dept: PHYSICAL THERAPY | Age: 78
Setting detail: RECURRING SERIES
Discharge: HOME | End: 2023-03-11
Payer: MEDICARE

## 2023-03-08 PROCEDURE — 97140 MANUAL THERAPY 1/> REGIONS: CPT

## 2023-03-08 PROCEDURE — 97110 THERAPEUTIC EXERCISES: CPT

## 2023-03-08 ASSESSMENT — PAIN SCALES - GENERAL: PAINLEVEL_OUTOF10: 6

## 2023-03-08 NOTE — PROGRESS NOTES
Nick   : 1945  Primary: Medicare Part A And B  Secondary: [address] @ [facility] [address]  [facility] [place] [address]  Phone: [phone]  Fax: [phone] No data recorded  No data recorded    PT Visit Info: Total # of Visits to Date: 28      OUTPATIENT PHYSICAL THERAPY:OP NOTE TYPE: Treatment Note 3/8/2023       Episode  }Appt Desk              Treatment Diagnosis: Radiculopathy, cervical region, M54.12  Radiculopathy, lumbar region, M54.16  Pain in joint, right shoulder, M25.511  Difficulty walking, not elsewhere classified, R26.2  Low back pain, M54.5  Medical/Referring Diagnosis:  Radiculopathy, lumbar region [M54.16]  Radiculopathy, cervical region [M54.12]  Referring Physician:  Maria Esther Ogden MD Orders:  PT Eval and Treat   TREATMENT PLAN:  Effective Dates: 23 TO 23 (90 days). Frequency/Duration: 1 time every 2 weeks for 6 weeks then 1 time every 3-4 weeks for 6 weeks   Date of Onset:  No data recorded   Allergies:   Patient has no allergy information on record. Restrictions/Precautions:  No data recordedNo data recorded   Interventions Planned (Treatment may consist of any combination of the following):  Balance Exercise  Bed Mobility  Cold  Cryotherapy  Electrical Stimulation  Gait Training  Heat  Manual Therapy  Neuromuscular Re-education/Strengthening  Range of Motion (ROM)  Therapeutic Activites  Therapeutic Exercise/Strengthening  No data recorded   Subjective Comments:Patient reports that her back is doing better today and her shoulder continues to be difficult     Initial:}    6/10Post Session:       3/10  Medications Last Reviewed:  3/8/2023  Updated Objective Findings: IT band tightness B  Treatment   THERAPEUTIC EXERCISE: (15 minutes):  Exercises per grid below to improve mobility, strength, balance and coordination.   Required minimal visual, verbal and manual cues to promote proper body alignment, promote proper body posture, promote proper body mechanics and promote proper body breathing techniques. Progressed resistance, range and complexity of movement as indicated.     Date:  1/6/23 Date:  1/18/23 Date:  2/2/23 Date:  2/22/23 Date:  3/8/23   Activity/Exercise        Axial elongation   10 x 5 sec hold 10 x 5 sec hold 10 x 5 sec hold   Wand ER X 20       Supine ER        Scap retractions X 20 X 20  X 20    Single knee to chest        Wall elevation  X 20   X 20   Band extension        Hamstring glides        Lower trunk rotations X 10 B    X 10 B   Breathing training   X 3 min     UBE Nu Step machine x 10 min Nu Step machine x 5 min Nu Step x 8 min L1 Nu Step x 8 min L1 Nu Step x 8 min L1   Pulleys  X 3 min      ER         Hip abduction    Green band x 25 Green band x 25    Hamstring mobilizatoin        Hip adduction Seated ball squeeze and seated hip flexion march x 20 each   Seated ball squeeze and seated hip flexion march x 20 each Seated ball squeeze and seated hip flexion march x 20 each   Core Not today, it bothered her arm             MANUAL THERAPY: (40 minutes): Joint mobilization and Soft tissue mobilization was utilized and necessary because of the patient's restricted joint motion and restricted motion of soft tissue.   -Supine mobilization to cervical paraspinals, sub-occipitals, upper traps and lateral right shoulder and left shoulder   -Gentle traction mobilization for stenosis reduction  -PROM to cervical spine mobilization for side bending and rotation  -Right UE distraction with perturbation  -Passive ER with end range hold for facilitation  -Forward elevation to 90 deg with distraction  -Side lie scapular mobilization as well as pelvic mobility through anterior elevation and posterior depression  Sciatic nerve glide and axial elongation  -Hip piriformis stretch B  -Side lie mobilization to pelvis for posterior depression and anterior elevation activation B  -Right hip flexor stretch and left glute and hamstring stretch in supine        Treatment/Session Summary:    Treatment Assessment:Patient shows continued to have increased tightness. Her shoulder continues to show stiffness and weakness. Communication/Consultation:  None today  Equipment provided today:  None  Recommendations/Intent for next treatment session: Next visit will focus on Lumbar and cervical mobility and shoulder stability.     Total Treatment Billable Duration:  55 minutes   Time In: 0900  Time Out: Leela Kingsley, PT       Charge Capture  }Post Session Pain  MedBridge Portal  MD Guidelines  Scanned Media  Benefits  MyChart    Future Appointments   Date Time Provider [place]   4/5/2023  9:00 AM Marti Tuckre PT [facility]

## 2023-04-05 ENCOUNTER — HOSPITAL ENCOUNTER (OUTPATIENT)
Dept: PHYSICAL THERAPY | Age: 78
Setting detail: RECURRING SERIES
Discharge: HOME | End: 2023-04-08
Payer: MEDICARE

## 2023-04-05 PROCEDURE — 97110 THERAPEUTIC EXERCISES: CPT

## 2023-04-05 PROCEDURE — 97140 MANUAL THERAPY 1/> REGIONS: CPT

## 2023-04-05 ASSESSMENT — PAIN SCALES - GENERAL: PAINLEVEL_OUTOF10: 3

## 2023-04-05 NOTE — THERAPY DISCHARGE
necessary   Total: (5 or greater = High Risk): 6   ©2010 [facility] . [facility] Patent #[id number]. Federal Law prohibits the replication, distribution or use without written permission from [facility] [employer]   Current Medications:         Current Outpatient Medications:   -Had a dose pack of steroids this past week (10/25/22)    atorvastatin (LIPITOR) 40 mg tablet, Take 40 mg by mouth daily. , Disp: , Rfl:     esomeprazole (NEXIUM) 20 mg capsule, Take  by mouth daily. , Disp: , Rfl:     aspirin 81 mg chewable tablet, Take 81 mg by mouth daily. , Disp: , Rfl:     IRON/VITAMIN B COMP W-C (GERITOL COMPLETE PO), Take  by mouth., Disp: , Rfl:     calcium-vitamin D (CALCIUM 500+D) 500 mg(1,250mg) -200 unit per tablet, Take 1 Tab by mouth two (2) times daily (with meals). , Disp: , Rfl:    Date Last Reviewed:  10/25/2022      Number of Personal Factors/Comorbidities that affect the Plan of Care: 1-2: MODERATE COMPLEXITY   EXAMINATION:   Observation/Orthostatic Postural Assessment:          Patient shows increased forward head and rounded shoulders posture. She shows increased right shoulder elevation and decreased ability to raise the R UE above shoulder height. She shows increased lordosis in standing and increased left pelvic and right ribcage shift. She shows some scoliosis through her thoracic spine.     Palpation:          Tightness along paraspinals in standing and increased tightness in right QL  -Increased restriction in right and left hip rotators  -SLR shows good mobility of hamstrings  -Cervical spine shows increased suboccipital tightness, increased right upper trap restrictions and she holds it tight/compenstaion  -Decreased cervical spine mobility for PA direction in lower cervical spine  -Limited with side bending and rotation motion throughout the cervical spine  -elevated right first rib  -Increased joint crepitus in right shoulder with abduction, ER and horizontal

## 2023-04-05 NOTE — PROGRESS NOTES
Selina Vaughn  : 1945  Primary: Medicare Part A And B  Secondary: [address] @ [facility] [employer] [facility]  [facility] [place] [address]  Phone: [phone]  Fax: [phone] No data recorded  No data recorded    PT Visit Info: Total # of Visits to Date: 39      OUTPATIENT PHYSICAL THERAPY:OP NOTE TYPE: Treatment Note 2023       Episode  }Appt Desk              Treatment Diagnosis: Radiculopathy, cervical region, M54.12  Radiculopathy, lumbar region, M54.16  Pain in joint, right shoulder, M25.511  Difficulty walking, not elsewhere classified, R26.2  Low back pain, M54.5  Medical/Referring Diagnosis:  Radiculopathy, lumbar region [M54.16]  Radiculopathy, cervical region [M54.12]  Referring Physician:  Floresita Bell MD Orders:  PT Eval and Treat   TREATMENT PLAN:  Effective Dates: 23 TO 23 (90 days). Frequency/Duration: 1 time every 2 weeks for 6 weeks then 1 time every 3-4 weeks for 6 weeks   Date of Onset:  No data recorded   Allergies:   Patient has no allergy information on record. Restrictions/Precautions:  No data recordedNo data recorded   Interventions Planned (Treatment may consist of any combination of the following):  Balance Exercise  Bed Mobility  Cold  Cryotherapy  Electrical Stimulation  Gait Training  Heat  Manual Therapy  Neuromuscular Re-education/Strengthening  Range of Motion (ROM)  Therapeutic Activites  Therapeutic Exercise/Strengthening  No data recorded   Subjective Comments:Patient reports that her back is doing better today and her shoulder continues to be difficult and will look into seeing a doctor soon maybe     Initial:}    3/10Post Session:       1/10  Medications Last Reviewed:  2023  Updated Objective Findings: IT band tightness B  Treatment   THERAPEUTIC EXERCISE: (15 minutes):  Exercises per grid below to improve mobility, strength, balance and coordination.   Required minimal visual, verbal and

## 2024-02-15 ENCOUNTER — HOSPITAL ENCOUNTER (OUTPATIENT)
Dept: PHYSICAL THERAPY | Age: 79
Setting detail: RECURRING SERIES
Discharge: HOME | End: 2024-02-18
Payer: MEDICARE

## 2024-02-15 DIAGNOSIS — M25.511 CHRONIC PAIN IN RIGHT SHOULDER: ICD-10-CM

## 2024-02-15 DIAGNOSIS — M25.511 CHRONIC PAIN OF BOTH SHOULDERS: Primary | ICD-10-CM

## 2024-02-15 DIAGNOSIS — G89.29 CHRONIC PAIN OF BOTH SHOULDERS: Primary | ICD-10-CM

## 2024-02-15 DIAGNOSIS — M54.2 CERVICALGIA: ICD-10-CM

## 2024-02-15 DIAGNOSIS — G89.29 CHRONIC PAIN IN RIGHT SHOULDER: ICD-10-CM

## 2024-02-15 DIAGNOSIS — M25.512 CHRONIC PAIN OF BOTH SHOULDERS: Primary | ICD-10-CM

## 2024-02-15 PROCEDURE — 97140 MANUAL THERAPY 1/> REGIONS: CPT

## 2024-02-15 PROCEDURE — 97162 PT EVAL MOD COMPLEX 30 MIN: CPT

## 2024-02-15 ASSESSMENT — PAIN SCALES - GENERAL: PAINLEVEL_OUTOF10: 6

## 2024-02-15 NOTE — THERAPY EVALUATION
shoulder pain due to arthritis and muscle dysfunction. She shows significant motion loss of the joint and joint capsules with right worse than left and shows increased pain with all mobility. She is a good candidate for skilled PT in order to address listed impairments affecting her function.  Walt Garcia, PT, DPT, OCS, CFMT      Therapy Problem List: (Impacting functional limitations):    Increased Pain, Decreased Strength, Decreased ROM, Decreased Ambulation Ability/Technique, Decreased Functional Mobility, Decreased [place] with Home Exercise Program, Decreased Posture, Decreased Balance, Decreased Body Mechanics, Difficulty Sleeping, and Decreased Activity Tolerance/Endurance*   Therapy Prognosis:   Good     Initial Assessment Complexity:   Moderate Complexity       PLAN   Effective Dates: 2/15/2024 TO Plan of Care/Certification Expiration Date: 05/15/24     Frequency/Duration: Plan Frequency: 1 time a week for 12 weeks      Interventions Planned (Treatment may consist of any combination of the following):    Functional Mobility Training, Home Exercise Program (HEP), Manual Therapy, Neuromuscular Re-education/Strengthening, Pain Management, Positioning, Range of Motion (ROM), Therapeutic Activites, and Therapeutic Exercise/StrengtheningCurrent Treatment Recommendations: Strengthening; ROM; Functional mobility training; Balance training; Neuromuscular re-education; Manual; Home exercise program; Modalities; Dry needling; Aquatics    Goals: (Goals have been discussed and agreed upon with patient.)  Short-Term Functional Goals: Time Frame: 4 weeks  Patient will report a greater than 25% improvement in overall symptoms in order to reduce shoulder discomfort  Patient will show a greater than 5 degree increase in right shoulder ER in order to help her mobility  Patient will be independent in HEP for shoulder mobility training  Discharge Goals: Time Frame: 12 weeks  Patient will report a greater than 50%

## 2024-02-15 NOTE — PROGRESS NOTES
Damari Conroy  : 1945  Primary: Medicare Part A And B (Medicare)  Secondary: [facility] [place] [address]  Phone: [phone]  Fax: [phone] Plan Frequency: 1 time a week for 12 weeks  Plan of Care/Certification Expiration Date: 05/15/24        Plan of Care/Certification Expiration Date:  Plan of Care/Certification Expiration Date: 05/15/24    Frequency/Duration: Plan Frequency: 1 time a week for 12 weeks      Time In/Out:   Time In: 0900  Time Out: 0946      PT Visit Info:         Visit Count:  1    OUTPATIENT PHYSICAL THERAPY:   Treatment Note 2/15/2024       Episode  (Right shoulder pain)               Treatment Diagnosis:    Chronic pain of both shoulders  Cervicalgia  Chronic pain in right shoulder  Medical/Referring Diagnosis:    Other specific arthropathies, not elsewhere classified, right shoulder    Referring Physician:  Kat Hoyos MD MD Orders:  PT Eval and Treat   Return MD Appt:     Date of Onset:  Onset Date: 24 (Chronic)     Allergies:   Patient has no allergy information on record.  Restrictions/Precautions:   None      Interventions Planned (Treatment may consist of any combination of the following):  Current Treatment Recommendations: Strengthening; ROM; Functional mobility training; Balance training; Neuromuscular re-education; Manual; Home exercise program; Modalities; Dry needling; Aquatics    See Assessment Note    Subjective Comments:   Patient reports that her shoulder has gotten worse over the last year, but she still wants to put off any surgery. She reports that she is having memory issues and they won't let her drive now.  Initial Pain Level::     6/10  Post Session Pain Level:       4/10  Medications Last Reviewed:  2/15/2024  Updated Objective Findings:  See Evaluation Note from today  Treatment   MANUAL THERAPY: (10 minutes):   Joint mobilization and Soft tissue mobilization was

## 2024-02-23 ENCOUNTER — HOSPITAL ENCOUNTER (OUTPATIENT)
Dept: PHYSICAL THERAPY | Age: 79
Setting detail: RECURRING SERIES
Discharge: HOME | End: 2024-02-26
Payer: MEDICARE

## 2024-02-23 PROCEDURE — 97140 MANUAL THERAPY 1/> REGIONS: CPT

## 2024-02-23 PROCEDURE — 97110 THERAPEUTIC EXERCISES: CPT

## 2024-02-23 ASSESSMENT — PAIN SCALES - GENERAL: PAINLEVEL_OUTOF10: 4

## 2024-02-23 NOTE — PROGRESS NOTES
Damari Conroy  : 1945  Primary: Medicare Part A And B (Medicare)  Secondary: [facility] [place] [facility] [address]  [facility] [address]  Phone: [phone]  Fax: [phone] Plan Frequency: 1 time a week for 12 weeks  Plan of Care/Certification Expiration Date: 05/15/24        Plan of Care/Certification Expiration Date:  Plan of Care/Certification Expiration Date: 05/15/24    Frequency/Duration: Plan Frequency: 1 time a week for 12 weeks      Time In/Out:   Time In: 1345  Time Out: 1430      PT Visit Info:         Visit Count:  2    OUTPATIENT PHYSICAL THERAPY:   Treatment Note 2024       Episode  (Right shoulder pain)               Treatment Diagnosis:    Chronic pain of both shoulders  Cervicalgia  Chronic pain in right shoulder  Medical/Referring Diagnosis:    Other specific arthropathies, not elsewhere classified, right shoulder    Referring Physician:  Kat Hoyos MD MD Orders:  PT Eval and Treat   Return MD Appt:     Date of Onset:  Onset Date: 24 (Chronic)     Allergies:   Patient has no allergy information on record.  Restrictions/Precautions:   None      Interventions Planned (Treatment may consist of any combination of the following):  Current Treatment Recommendations: Strengthening; ROM; Functional mobility training; Balance training; Neuromuscular re-education; Manual; Home exercise program; Modalities; Dry needling; Aquatics      Subjective Comments: Patient reports her R shoulder is much more painful and stiff than the L and her neck bothers her a good bit.      Initial Pain Level::     4/10  Post Session Pain Level:        \"better\"/10  Medications Last Reviewed:  2024  Updated Objective Findings:   /78  Treatment   THERAPEUTIC EXERCISE: (15 minutes):    Exercises per grid below to improve mobility and strength.  Required moderate visual, verbal, and tactile cues to promote proper body alignment, promote proper

## 2024-02-27 ENCOUNTER — HOSPITAL ENCOUNTER (OUTPATIENT)
Dept: PHYSICAL THERAPY | Age: 79
Setting detail: RECURRING SERIES
End: 2024-02-27
Payer: MEDICARE

## 2024-02-27 NOTE — PROGRESS NOTES
Damari Conroy  : 1945  Primary: Medicare Part A And B  Secondary: [unfilled] [place] [address]  [phone]   [phone]      Patient called to cancel today's appointment, she has tested positive for covid.

## 2024-03-07 ENCOUNTER — HOSPITAL ENCOUNTER (OUTPATIENT)
Dept: PHYSICAL THERAPY | Age: 79
Setting detail: RECURRING SERIES
Discharge: HOME | End: 2024-03-10
Payer: MEDICARE

## 2024-03-07 PROCEDURE — 97110 THERAPEUTIC EXERCISES: CPT

## 2024-03-07 PROCEDURE — 97140 MANUAL THERAPY 1/> REGIONS: CPT

## 2024-03-07 ASSESSMENT — PAIN SCALES - GENERAL: PAINLEVEL_OUTOF10: 3

## 2024-03-07 NOTE — PROGRESS NOTES
moderate visual, verbal, and tactile cues to promote proper body alignment, promote proper body posture, promote proper body mechanics, and promote proper body breathing techniques.  Progressed resistance, range, and repetitions as indicated.   Date:  2/23/24 Date:  3/7/24 Date:     Activity/Exercise Parameters Parameters Parameters   education Posture, anatomy of neck and shoulders     Scapular retractions Hold 3, 2x10 Hold 3, 2x10    Supine wand flexion reviewed reviewed    Supine wand press reviewed reviewed    Pendulum swings reviewed reviewed    Pulleys shoulder flexion  X20 to tolerance              MANUAL THERAPY: (30 minutes):   Joint mobilization and Soft tissue mobilization was utilized and necessary because of the patient's restricted joint motion, loss of articular motion, and restricted motion of soft tissue.   -Seated soft tissue mobilization to upper traps, levator scap and distal bicep/deltoid region  -Distraction and gentle oscillations to GH joint B for decreased pain with mobility  -inferior glide in abduction to regain joint motion.  PROM all planes B shoulders in supine    HEP-supine wand flexion B, push press and pendulum, scap retractions, pulleys    Treatment/Session Summary:    Treatment Assessment: Patient admits memory issues make HEP difficult for her at home sometimes.  Re-printed and reviewed all HEP.  Patient needing moderate cueing for scapular retractions, but did well with pulley flexion, with decreased pain overall after session.  Communication/Consultation:  None today  Equipment provided today:  HEP Access Code [email or web address]   Recommendations/Intent for next treatment session: Next visit will focus on ROM.    >Total Treatment Billable Duration:  40 minutes   Time In: 1400  Time Out: 1440    Adriana Truong PTA         Charge Capture  [employer] Portal  Appt Desk     Future Appointments   Date Time Provider Department Center   3/15/2024  9:00 AM Adriana Truong PTA [facility]   3/22/2024

## 2024-03-15 ENCOUNTER — HOSPITAL ENCOUNTER (OUTPATIENT)
Dept: PHYSICAL THERAPY | Age: 79
Setting detail: RECURRING SERIES
Discharge: HOME | End: 2024-03-18
Payer: MEDICARE

## 2024-03-15 PROCEDURE — 97140 MANUAL THERAPY 1/> REGIONS: CPT

## 2024-03-15 PROCEDURE — 97110 THERAPEUTIC EXERCISES: CPT

## 2024-03-15 NOTE — PROGRESS NOTES
Damari Conroy  : 1945  Primary: Medicare Part A And B (Medicare)  Secondary: CARLEY [facility] [place] [facility] [place] [address]  Phone: [phone]  Fax: [phone] Plan Frequency: 1 time a week for 12 weeks  Plan of Care/Certification Expiration Date: 05/15/24        Plan of Care/Certification Expiration Date:  Plan of Care/Certification Expiration Date: 05/15/24    Frequency/Duration: Plan Frequency: 1 time a week for 12 weeks      Time In/Out:   Time In: 0915  Time Out: 1000      PT Visit Info:         Visit Count:  4    OUTPATIENT PHYSICAL THERAPY:   Treatment Note 3/15/2024       Episode  (Right shoulder pain)               Treatment Diagnosis:    Chronic pain of both shoulders  Cervicalgia  Chronic pain in right shoulder  Medical/Referring Diagnosis:    Other specific arthropathies, not elsewhere classified, right shoulder    Referring Physician:  Kat Hoyos MD MD Orders:  PT Eval and Treat   Return MD Appt:     Date of Onset:  Onset Date: 24 (Chronic)     Allergies:   Patient has no allergy information on record.  Restrictions/Precautions:   None      Interventions Planned (Treatment may consist of any combination of the following):  Current Treatment Recommendations: Strengthening; ROM; Functional mobility training; Balance training; Neuromuscular re-education; Manual; Home exercise program; Modalities; Dry needling; Aquatics      Subjective Comments: Patient reports her shoulders are doing OK, R continues to be most painful, but does feel the PT is helping.    Initial Pain Level::      no VAS/10  Post Session Pain Level:        no VAS/10  Medications Last Reviewed:  3/15/2024  Updated Objective Findings:  None Today  Treatment   THERAPEUTIC EXERCISE: (15 minutes):    Exercises per grid below to improve mobility and strength.  Required moderate visual, verbal, and tactile cues to promote proper body alignment, promote proper

## 2024-03-21 ENCOUNTER — HOSPITAL ENCOUNTER (EMERGENCY)
Age: 79
Discharge: HOME | End: 2024-03-21
Payer: MEDICARE

## 2024-03-21 ENCOUNTER — APPOINTMENT (OUTPATIENT)
Dept: GENERAL RADIOLOGY | Age: 79
End: 2024-03-21
Payer: MEDICARE

## 2024-03-21 ENCOUNTER — APPOINTMENT (OUTPATIENT)
Dept: CT IMAGING | Age: 79
End: 2024-03-21
Payer: MEDICARE

## 2024-03-21 VITALS
SYSTOLIC BLOOD PRESSURE: 178 MMHG | HEART RATE: 96 BPM | DIASTOLIC BLOOD PRESSURE: 79 MMHG | BODY MASS INDEX: 31.41 KG/M2 | OXYGEN SATURATION: 94 % | HEIGHT: 60 IN | WEIGHT: 160 LBS | RESPIRATION RATE: 17 BRPM | TEMPERATURE: 98.1 F

## 2024-03-21 DIAGNOSIS — R10.9 RIGHT FLANK PAIN: Primary | ICD-10-CM

## 2024-03-21 DIAGNOSIS — M54.50 ACUTE RIGHT-SIDED LOW BACK PAIN WITHOUT SCIATICA: ICD-10-CM

## 2024-03-21 LAB
ALBUMIN SERPL-MCNC: 3.8 G/DL (ref 3.2–4.6)
ALBUMIN/GLOB SERPL: 1 (ref 0.4–1.6)
ALP SERPL-CCNC: 85 U/L (ref 50–130)
ALT SERPL-CCNC: 26 U/L (ref 12–65)
ANION GAP SERPL CALC-SCNC: 8 MMOL/L (ref 2–11)
AST SERPL-CCNC: 24 U/L (ref 15–37)
BACTERIA URNS QL MICRO: NEGATIVE /HPF
BASOPHILS # BLD: 0.1 K/UL (ref 0–0.2)
BASOPHILS NFR BLD: 1 % (ref 0–2)
BILIRUB SERPL-MCNC: 0.4 MG/DL (ref 0.2–1.1)
BUN SERPL-MCNC: 22 MG/DL (ref 8–23)
CALCIUM SERPL-MCNC: 9.5 MG/DL (ref 8.3–10.4)
CASTS URNS QL MICRO: ABNORMAL /LPF
CHLORIDE SERPL-SCNC: 105 MMOL/L (ref 103–113)
CO2 SERPL-SCNC: 25 MMOL/L (ref 21–32)
CREAT SERPL-MCNC: 0.96 MG/DL (ref 0.6–1)
DIFFERENTIAL METHOD BLD: NORMAL
EOSINOPHIL # BLD: 0.4 K/UL (ref 0–0.8)
EOSINOPHIL NFR BLD: 5 % (ref 0.5–7.8)
EPI CELLS #/AREA URNS HPF: ABNORMAL /HPF
ERYTHROCYTE [DISTWIDTH] IN BLOOD BY AUTOMATED COUNT: 13.7 % (ref 11.9–14.6)
GLOBULIN SER CALC-MCNC: 3.9 G/DL (ref 2.8–4.5)
GLUCOSE SERPL-MCNC: 138 MG/DL (ref 65–100)
HCT VFR BLD AUTO: 39.5 % (ref 35.8–46.3)
HGB BLD-MCNC: 13 G/DL (ref 11.7–15.4)
IMM GRANULOCYTES # BLD AUTO: 0 K/UL (ref 0–0.5)
IMM GRANULOCYTES NFR BLD AUTO: 0 % (ref 0–5)
LACTATE SERPL-SCNC: 1.3 MMOL/L (ref 0.4–2)
LIPASE SERPL-CCNC: 64 U/L (ref 73–393)
LYMPHOCYTES # BLD: 2.1 K/UL (ref 0.5–4.6)
LYMPHOCYTES NFR BLD: 29 % (ref 13–44)
MCH RBC QN AUTO: 28.2 PG (ref 26.1–32.9)
MCHC RBC AUTO-ENTMCNC: 32.9 G/DL (ref 31.4–35)
MCV RBC AUTO: 85.7 FL (ref 82–102)
MONOCYTES # BLD: 0.7 K/UL (ref 0.1–1.3)
MONOCYTES NFR BLD: 9 % (ref 4–12)
NEUTS SEG # BLD: 4.1 K/UL (ref 1.7–8.2)
NEUTS SEG NFR BLD: 55 % (ref 43–78)
NRBC # BLD: 0 K/UL (ref 0–0.2)
PLATELET # BLD AUTO: 221 K/UL (ref 150–450)
PMV BLD AUTO: 9.7 FL (ref 9.4–12.3)
POTASSIUM SERPL-SCNC: 3.8 MMOL/L (ref 3.5–5.1)
PROT SERPL-MCNC: 7.7 G/DL (ref 6.3–8.2)
RBC # BLD AUTO: 4.61 M/UL (ref 4.05–5.2)
RBC #/AREA URNS HPF: ABNORMAL /HPF
SODIUM SERPL-SCNC: 138 MMOL/L (ref 136–146)
TROPONIN I SERPL HS-MCNC: 6 PG/ML (ref 0–14)
WBC # BLD AUTO: 7.4 K/UL (ref 4.3–11.1)
WBC URNS QL MICRO: ABNORMAL /HPF

## 2024-03-21 PROCEDURE — 74177 CT ABD & PELVIS W/CONTRAST: CPT

## 2024-03-21 PROCEDURE — 71046 X-RAY EXAM CHEST 2 VIEWS: CPT

## 2024-03-21 PROCEDURE — 99285 EMERGENCY DEPT VISIT HI MDM: CPT

## 2024-03-21 PROCEDURE — 96375 TX/PRO/DX INJ NEW DRUG ADDON: CPT

## 2024-03-21 PROCEDURE — 93005 ELECTROCARDIOGRAM TRACING: CPT | Performed by: STUDENT IN AN ORGANIZED HEALTH CARE EDUCATION/TRAINING PROGRAM

## 2024-03-21 PROCEDURE — 83605 ASSAY OF LACTIC ACID: CPT

## 2024-03-21 PROCEDURE — 96374 THER/PROPH/DIAG INJ IV PUSH: CPT

## 2024-03-21 PROCEDURE — 85025 COMPLETE CBC W/AUTO DIFF WBC: CPT

## 2024-03-21 PROCEDURE — 6360000004 HC RX CONTRAST MEDICATION: Performed by: STUDENT IN AN ORGANIZED HEALTH CARE EDUCATION/TRAINING PROGRAM

## 2024-03-21 PROCEDURE — 84484 ASSAY OF TROPONIN QUANT: CPT

## 2024-03-21 PROCEDURE — 83690 ASSAY OF LIPASE: CPT

## 2024-03-21 PROCEDURE — 81003 URINALYSIS AUTO W/O SCOPE: CPT

## 2024-03-21 PROCEDURE — 81015 MICROSCOPIC EXAM OF URINE: CPT

## 2024-03-21 PROCEDURE — 6360000002 HC RX W HCPCS: Performed by: STUDENT IN AN ORGANIZED HEALTH CARE EDUCATION/TRAINING PROGRAM

## 2024-03-21 PROCEDURE — 80053 COMPREHEN METABOLIC PANEL: CPT

## 2024-03-21 RX ORDER — ONDANSETRON 2 MG/ML
4 INJECTION INTRAMUSCULAR; INTRAVENOUS
Status: COMPLETED | OUTPATIENT
Start: 2024-03-21 | End: 2024-03-21

## 2024-03-21 RX ORDER — MELOXICAM 15 MG/1
15 TABLET ORAL DAILY
Qty: 15 TABLET | Refills: 0 | Status: SHIPPED | OUTPATIENT
Start: 2024-03-21 | End: 2024-04-05

## 2024-03-21 RX ORDER — KETOROLAC TROMETHAMINE 15 MG/ML
15 INJECTION, SOLUTION INTRAMUSCULAR; INTRAVENOUS ONCE
Status: COMPLETED | OUTPATIENT
Start: 2024-03-21 | End: 2024-03-21

## 2024-03-21 RX ADMIN — ONDANSETRON 4 MG: 2 INJECTION INTRAMUSCULAR; INTRAVENOUS at 17:37

## 2024-03-21 RX ADMIN — IOPAMIDOL 100 ML: 755 INJECTION, SOLUTION INTRAVENOUS at 18:24

## 2024-03-21 RX ADMIN — KETOROLAC TROMETHAMINE 15 MG: 15 INJECTION, SOLUTION INTRAMUSCULAR; INTRAVENOUS at 17:38

## 2024-03-21 ASSESSMENT — ENCOUNTER SYMPTOMS
COUGH: 0
NAUSEA: 1
PHOTOPHOBIA: 0
ABDOMINAL PAIN: 1
TROUBLE SWALLOWING: 0
FACIAL SWELLING: 0

## 2024-03-21 ASSESSMENT — PAIN SCALES - GENERAL
PAINLEVEL_OUTOF10: 5
PAINLEVEL_OUTOF10: 5

## 2024-03-21 ASSESSMENT — PAIN DESCRIPTION - ORIENTATION: ORIENTATION: RIGHT

## 2024-03-21 ASSESSMENT — PAIN - FUNCTIONAL ASSESSMENT: PAIN_FUNCTIONAL_ASSESSMENT: 0-10

## 2024-03-21 ASSESSMENT — PAIN DESCRIPTION - LOCATION: LOCATION: FLANK

## 2024-03-21 ASSESSMENT — PAIN DESCRIPTION - DESCRIPTORS: DESCRIPTORS: ACHING

## 2024-03-21 NOTE — ED PROVIDER NOTES
Emergency Department Provider Note       PCP: Buzz Mckeon APRN - NP   Age: 78 y.o.   Sex: female     DISPOSITION Decision To Discharge 03/21/2024 07:16:26 PM       ICD-10-CM    1. Right flank pain  R10.9       2. Acute right-sided low back pain without sciatica  M54.50           Medical Decision Making     In summary this is a 78-year-old female patient presented for evaluation of right-sided back pain that began yesterday.  It is worse with movements and I do feel there is a muscular component to this pain.  She does not have any findings concerning for cauda equina, epidural abscess, acute fracture.  Her workup here was very reassuring.  Labs stable.  Negative troponin.  EKG without ischemia.  CT abdomen pelvis did not show any acute process.  Did show significant degenerative arthritis in the back.  We will treat her pain symptomatically and have her follow-up with her family doctor.  Counseled on signs to return for.  The patient has verbalized understanding and agreed with the plan.  Discharged in stable condition.  ED Course as of 03/21/24 2004   u Mar 21, 2024   1950 7:50 PM  UA negative for bacteria [NR]      ED Course User Index  [NR] Sky Davila PA     1 acute complicated illness or injury.  Over the counter drug management performed.  Prescription drug management performed.  Patient was discharged risks and benefits of hospitalization were considered.  Chronic medical problems impacting care include hysterectomy, type 2 diabetes.  Shared medical decision making was utilized in creating the patients health plan today.  ED attending physician present in department at time of care. Based on current hospital policy, their co-signature is not required on this note.   I independently ordered and reviewed each unique test.     The patients assessment required an independent historian: Patient friend.  The reason they were needed is important historical information not provided by the

## 2024-03-21 NOTE — ED TRIAGE NOTES
Pt w/c to triage with c/o right flank pain since last night with nausea. Pt denies trouble urinating. Pt denies history of kidney stones/UTI.

## 2024-03-21 NOTE — DISCHARGE INSTRUCTIONS
Your workup here was very reassuring.  Your CT scan did show quite a bit of arthritis in your spine which could be contributing to your pain.  Please take prescribed pain medication to control your symptoms.  Follow-up with your primary care provider.  Return here for new or worsening symptoms    As we discussed, I did not find a life threatening cause of your symptoms today. However, THAT DOES NOT MEAN IT COULD NOT DEVELOP. If you develop ANY new or worsening symptoms, it is critical that you return for re-evaluation. This includes any symptoms that are concerning to you, especially symptoms such as numbness in your groin, difficulty holding your bowels or urine, chest pain, shortness of breath.  If you do not return for re-evaluation, you risk serious complications, including death.

## 2024-03-22 ENCOUNTER — HOSPITAL ENCOUNTER (OUTPATIENT)
Dept: PHYSICAL THERAPY | Age: 79
Setting detail: RECURRING SERIES
Discharge: HOME | End: 2024-03-25
Payer: MEDICARE

## 2024-03-22 LAB
BILIRUB UR QL: NEGATIVE
EKG ATRIAL RATE: 87 BPM
EKG DIAGNOSIS: NORMAL
EKG P AXIS: 45 DEGREES
EKG P-R INTERVAL: 152 MS
EKG Q-T INTERVAL: 370 MS
EKG QRS DURATION: 82 MS
EKG QTC CALCULATION (BAZETT): 445 MS
EKG R AXIS: -39 DEGREES
EKG T AXIS: 60 DEGREES
EKG VENTRICULAR RATE: 87 BPM
GLUCOSE UR QL STRIP.AUTO: NEGATIVE MG/DL
KETONES UR-MCNC: NEGATIVE MG/DL
LEUKOCYTE ESTERASE UR QL STRIP: ABNORMAL
NITRITE UR QL: NEGATIVE
PH UR: 6 (ref 5–9)
PROT UR QL: NEGATIVE MG/DL
RBC # UR STRIP: NEGATIVE
SP GR UR: 1.01 (ref 1–1.02)
UROBILINOGEN UR QL: 0.2 EU/DL (ref 0.2–1)

## 2024-03-22 PROCEDURE — 97110 THERAPEUTIC EXERCISES: CPT

## 2024-03-22 PROCEDURE — 93010 ELECTROCARDIOGRAM REPORT: CPT | Performed by: INTERNAL MEDICINE

## 2024-03-22 PROCEDURE — 97140 MANUAL THERAPY 1/> REGIONS: CPT

## 2024-03-22 ASSESSMENT — PAIN SCALES - GENERAL: PAINLEVEL_OUTOF10: 4

## 2024-03-22 NOTE — PROGRESS NOTES
Damari Conroy  : 1945  Primary: Medicare Part A And B (Medicare)  Secondary: CARLEY [facility] [place] [address]  Phone: [phone]  Fax: [phone] Plan Frequency: 1 time a week for 12 weeks  Plan of Care/Certification Expiration Date: 05/15/24        Plan of Care/Certification Expiration Date:  Plan of Care/Certification Expiration Date: 05/15/24    Frequency/Duration: Plan Frequency: 1 time a week for 12 weeks      Time In/Out:   Time In: 902  Time Out: 949      PT Visit Info:         Visit Count:  5    OUTPATIENT PHYSICAL THERAPY:   Treatment Note 3/22/2024       Episode  (Right shoulder pain)               Treatment Diagnosis:    Chronic pain of both shoulders  Cervicalgia  Chronic pain in right shoulder  Medical/Referring Diagnosis:    Other specific arthropathies, not elsewhere classified, right shoulder    Referring Physician:  Kat Hoyos MD MD Orders:  PT Eval and Treat   Return MD Appt:     Date of Onset:  Onset Date: 24 (Chronic)     Allergies:   Patient has no known allergies.  Restrictions/Precautions:   None      Interventions Planned (Treatment may consist of any combination of the following):  Current Treatment Recommendations: Strengthening; ROM; Functional mobility training; Balance training; Neuromuscular re-education; Manual; Home exercise program; Modalities; Dry needling; Aquatics      Subjective Comments: Patient reports she had unrelenting back pain yesterday afternoon and went to the ER. They did a lot of tests and she has some medications to take now and doing a little better    Initial Pain Level::     4/10  Post Session Pain Level:       2/10  Medications Last Reviewed:  3/22/2024  Updated Objective Findings: right shoulder GH joint and scapula still restricted  Treatment   THERAPEUTIC EXERCISE: (15 minutes):    Exercises per grid below to improve mobility and strength.  Required moderate

## 2024-03-22 NOTE — ED NOTES
I have reviewed discharge instructions with the patient.  The patient verbalized understanding.    Patient left ED via Discharge Method: ambulatory to Home with (insert name of family/friend, self, transport).    Opportunity for questions and clarification provided.       Patient given 1 scripts.         To continue your aftercare when you leave the hospital, you may receive an automated call from our care team to check in on how you are doing.  This is a free service and part of our promise to provide the best care and service to meet your aftercare needs.” If you have questions, or wish to unsubscribe from this service please call [phone].  Thank you for Choosing our [facility] Emergency Department.

## 2024-03-29 ENCOUNTER — HOSPITAL ENCOUNTER (OUTPATIENT)
Dept: PHYSICAL THERAPY | Age: 79
Setting detail: RECURRING SERIES
Discharge: HOME | End: 2024-04-01
Payer: MEDICARE

## 2024-03-29 PROCEDURE — 97110 THERAPEUTIC EXERCISES: CPT

## 2024-03-29 PROCEDURE — 97140 MANUAL THERAPY 1/> REGIONS: CPT

## 2024-03-29 ASSESSMENT — PAIN SCALES - GENERAL: PAINLEVEL_OUTOF10: 4

## 2024-03-29 NOTE — PROGRESS NOTES
Damari Conroy  : 1945  Primary: Medicare Part A And B (Medicare)  Secondary: CARLEY [facility] [place] [facility] [place] [address]  Phone: [phone]  Fax: [phone] Plan Frequency: 1 time a week for 12 weeks  Plan of Care/Certification Expiration Date: 05/15/24        Plan of Care/Certification Expiration Date:  Plan of Care/Certification Expiration Date: 05/15/24    Frequency/Duration: Plan Frequency: 1 time a week for 12 weeks      Time In/Out:   Time In: 0900  Time Out: 1000      PT Visit Info:         Visit Count:  6    OUTPATIENT PHYSICAL THERAPY:   Treatment Note 3/29/2024       Episode  (Right shoulder pain)               Treatment Diagnosis:    Chronic pain of both shoulders  Cervicalgia  Chronic pain in right shoulder  Medical/Referring Diagnosis:    Other specific arthropathies, not elsewhere classified, right shoulder    Referring Physician:  Kat Hoyos MD MD Orders:  PT Eval and Treat   Return MD Appt:     Date of Onset:  Onset Date: 24 (Chronic)     Allergies:   Patient has no known allergies.  Restrictions/Precautions:   None      Interventions Planned (Treatment may consist of any combination of the following):  Current Treatment Recommendations: Strengthening; ROM; Functional mobility training; Balance training; Neuromuscular re-education; Manual; Home exercise program; Modalities; Dry needling; Aquatics      Subjective Comments: Patient reports she is doing better today.  in the back, but doing much better    Initial Pain Level::     4/10  Post Session Pain Level:       2/10  Medications Last Reviewed:  3/29/2024  Updated Objective Findings: right shoulder GH joint and scapula still restricted  Treatment   THERAPEUTIC EXERCISE: (25 minutes):    Exercises per grid below to improve mobility and strength.  Required moderate visual, verbal, and tactile cues to promote proper body alignment,

## 2024-04-01 ENCOUNTER — HOSPITAL ENCOUNTER (OUTPATIENT)
Dept: PHYSICAL THERAPY | Age: 79
Setting detail: RECURRING SERIES
Discharge: HOME | End: 2024-04-04
Payer: MEDICARE

## 2024-04-01 PROCEDURE — 97110 THERAPEUTIC EXERCISES: CPT

## 2024-04-01 PROCEDURE — 97140 MANUAL THERAPY 1/> REGIONS: CPT

## 2024-04-01 ASSESSMENT — PAIN SCALES - GENERAL: PAINLEVEL_OUTOF10: 3

## 2024-04-01 NOTE — PROGRESS NOTES
Damari Conroy  : 1945  Primary: Medicare Part A And B (Medicare)  Secondary: CARLEY [facility] [place] [address]  Phone: [phone]  Fax: [phone] Plan Frequency: 1 time a week for 12 weeks  Plan of Care/Certification Expiration Date: 05/15/24        Plan of Care/Certification Expiration Date:  Plan of Care/Certification Expiration Date: 05/15/24    Frequency/Duration: Plan Frequency: 1 time a week for 12 weeks      Time In/Out:   Time In: 915  Time Out: 1005      PT Visit Info:         Visit Count:  7    OUTPATIENT PHYSICAL THERAPY:   Treatment Note 2024       Episode  (Right shoulder pain)               Treatment Diagnosis:    Chronic pain of both shoulders  Cervicalgia  Chronic pain in right shoulder  Medical/Referring Diagnosis:    Other specific arthropathies, not elsewhere classified, right shoulder    Referring Physician:  Kat Hoyos MD MD Orders:  PT Eval and Treat   Return MD Appt:     Date of Onset:  Onset Date: 24 (Chronic)     Allergies:   Patient has no known allergies.  Restrictions/Precautions:   None      Interventions Planned (Treatment may consist of any combination of the following):  Current Treatment Recommendations: Strengthening; ROM; Functional mobility training; Balance training; Neuromuscular re-education; Manual; Home exercise program; Modalities; Dry needling; Aquatics      Subjective Comments: Patient reports she thinks she is noticing some improved mobility in her R shoulder, but is having R sided low back pain that is limiting her.  She reports a visit to the ER last week due to unmanageable pain in her back.     Initial Pain Level::     3/10  Post Session Pain Level:       3/10  Medications Last Reviewed:  2024  Updated Objective Findings: right shoulder GH joint and scapula still restricted  Treatment   THERAPEUTIC EXERCISE: (30 minutes):    Exercises per grid

## 2024-04-12 ENCOUNTER — HOSPITAL ENCOUNTER (OUTPATIENT)
Dept: PHYSICAL THERAPY | Age: 79
Setting detail: RECURRING SERIES
Discharge: HOME | End: 2024-04-15
Payer: MEDICARE

## 2024-04-12 PROCEDURE — 97110 THERAPEUTIC EXERCISES: CPT

## 2024-04-12 PROCEDURE — 97140 MANUAL THERAPY 1/> REGIONS: CPT

## 2024-04-12 ASSESSMENT — PAIN SCALES - GENERAL: PAINLEVEL_OUTOF10: 4

## 2024-04-12 NOTE — PROGRESS NOTES
Damari Conroy  : 1945  Primary: Medicare Part A And B (Medicare)  Secondary: CARLEY [facility] [place] [address]  Phone: [phone]  Fax: [phone] Plan Frequency: 1 time a week for 12 weeks  Plan of Care/Certification Expiration Date: 05/15/24        Plan of Care/Certification Expiration Date:  Plan of Care/Certification Expiration Date: 05/15/24    Frequency/Duration: Plan Frequency: 1 time a week for 12 weeks      Time In/Out:   Time In: 1000  Time Out: 1100      PT Visit Info:         Visit Count:  8    OUTPATIENT PHYSICAL THERAPY:   Treatment Note 2024       Episode  (Right shoulder pain)               Treatment Diagnosis:    Chronic pain of both shoulders  Cervicalgia  Chronic pain in right shoulder  Medical/Referring Diagnosis:    Other specific arthropathies, not elsewhere classified, right shoulder    Referring Physician:  Kat Hoyos MD MD Orders:  PT Eval and Treat   Return MD Appt:     Date of Onset:  Onset Date: 24 (Chronic)     Allergies:   Patient has no known allergies.  Restrictions/Precautions:   None      Interventions Planned (Treatment may consist of any combination of the following):  Current Treatment Recommendations: Strengthening; ROM; Functional mobility training; Balance training; Neuromuscular re-education; Manual; Home exercise program; Modalities; Dry needling; Aquatics      Subjective Comments: Patient reports that her back is better now, but still sore. She still has her usual issues with her shoulder on the right and has some more numbness in the left hand from time to time    Initial Pain Level::     4/10  Post Session Pain Level:       2/10  Medications Last Reviewed:  2024  Updated Objective Findings: right shoulder GH joint and scapula still restricted  Treatment   THERAPEUTIC EXERCISE: (15 minutes):    Exercises per grid below to improve mobility and

## 2024-04-17 ENCOUNTER — HOSPITAL ENCOUNTER (OUTPATIENT)
Dept: PHYSICAL THERAPY | Age: 79
Setting detail: RECURRING SERIES
Discharge: HOME | End: 2024-04-20
Payer: MEDICARE

## 2024-04-17 PROCEDURE — 97140 MANUAL THERAPY 1/> REGIONS: CPT

## 2024-04-17 PROCEDURE — 97110 THERAPEUTIC EXERCISES: CPT

## 2024-04-17 ASSESSMENT — PAIN SCALES - GENERAL: PAINLEVEL_OUTOF10: 3

## 2024-04-17 NOTE — PROGRESS NOTES
Damari Conroy  : 1945  Primary: Medicare Part A And B (Medicare)  Secondary: CAR[facility] [address]  [facility] [address]  Phone: [phone]  Fax: [phone] Plan Frequency: 1 time a week for 12 weeks  Plan of Care/Certification Expiration Date: 05/15/24        Plan of Care/Certification Expiration Date:  Plan of Care/Certification Expiration Date: 05/15/24    Frequency/Duration: Plan Frequency: 1 time a week for 12 weeks      Time In/Out:   Time In: 0900  Time Out: 0954      PT Visit Info:         Visit Count:  9    OUTPATIENT PHYSICAL THERAPY:   Treatment Note 2024       Episode  (Right shoulder pain)               Treatment Diagnosis:    Chronic pain of both shoulders  Cervicalgia  Chronic pain in right shoulder  Medical/Referring Diagnosis:    Other specific arthropathies, not elsewhere classified, right shoulder    Referring Physician:  Kat Hoyos MD MD Orders:  PT Eval and Treat   Return MD Appt:     Date of Onset:  Onset Date: 24 (Chronic)     Allergies:   Patient has no known allergies.  Restrictions/Precautions:   None      Interventions Planned (Treatment may consist of any combination of the following):  Current Treatment Recommendations: Strengthening; ROM; Functional mobility training; Balance training; Neuromuscular re-education; Manual; Home exercise program; Modalities; Dry needling; Aquatics      Subjective Comments: Patient reports that she is doing pretty good today with the usual shoulder stiffness and pain    Initial Pain Level::     3/10  Post Session Pain Level:       1/10  Medications Last Reviewed:  2024  Updated Objective Findings: right shoulder GH joint and scapula still restricted  Treatment   THERAPEUTIC EXERCISE: (23 minutes):    Exercises per grid below to improve mobility and strength.  Required moderate visual, verbal, and tactile cues to promote proper body alignment,

## 2024-04-24 ENCOUNTER — HOSPITAL ENCOUNTER (OUTPATIENT)
Dept: PHYSICAL THERAPY | Age: 79
Setting detail: RECURRING SERIES
Discharge: HOME | End: 2024-04-27
Payer: MEDICARE

## 2024-04-24 PROCEDURE — 97110 THERAPEUTIC EXERCISES: CPT

## 2024-04-24 PROCEDURE — 97140 MANUAL THERAPY 1/> REGIONS: CPT

## 2024-04-24 ASSESSMENT — PAIN SCALES - GENERAL: PAINLEVEL_OUTOF10: 3

## 2024-04-24 NOTE — PROGRESS NOTES
Damari Conroy  : 1945  Primary: Medicare Part A And B (Medicare)  Secondary: CARLEY [facility] [place] [address]  Phone: [phone]  Fax: [phone] Plan Frequency: 1 time a week for 12 weeks    Plan of Care/Certification Expiration Date: 05/15/24        Plan of Care/Certification Expiration Date:  Plan of Care/Certification Expiration Date: 05/15/24    Frequency/Duration: Plan Frequency: 1 time a week for 12 weeks      Time In/Out:   Time In: 909  Time Out: 958      PT Visit Info:         Visit Count:  10                OUTPATIENT PHYSICAL THERAPY:             Progress Report 2024               Episode (Right shoulder pain)         Treatment Diagnosis:     Chronic pain of both shoulders  Cervicalgia  Chronic pain in right shoulder  Medical/Referring Diagnosis:    Other specific arthropathies, not elsewhere classified, right shoulder    Referring Physician:  Kat Hoyos MD MD Orders:  PT Eval and Treat   Return MD Appt:  not sure  Date of Onset:  Onset Date: 24 (Chronic)     Allergies:  Patient has no known allergies.  Restrictions/Precautions:    None      Medications Last Reviewed:  2024     SUBJECTIVE   History of Injury/Illness (Reason for Referral):  Patient has a previous history of right shoulder pain and rotator cuff damage as well as arthritis. She had a fall last April and hit her head and broke her right wrist.  She reports that she was diagnosed with memory issues after that and had to stop driving.  She feels like her shoulders have still be really bad since then and she wants to put off a shoulder replacement as long as possible. She reports continued pain and stiffness in the Right shoulder more than left. She also reports continued pain in the cervical spine.  The left shoulder moves better, but still has a lot of pain too  Patient Stated Goal(s):  \"prevent shoulder

## 2024-04-24 NOTE — PROGRESS NOTES
Damari Conroy  : 1945  Primary: Medicare Part A And B (Medicare)  Secondary: CARLEY [facility] [address]  [facility] [address]  Phone: [phone]  Fax: [phone] Plan Frequency: 1 time a week for 12 weeks  Plan of Care/Certification Expiration Date: 05/15/24        Plan of Care/Certification Expiration Date:  Plan of Care/Certification Expiration Date: 05/15/24    Frequency/Duration: Plan Frequency: 1 time a week for 12 weeks      Time In/Out:   Time In: 909  Time Out: 958      PT Visit Info:         Visit Count:  10    OUTPATIENT PHYSICAL THERAPY:   Treatment Note 2024       Episode  (Right shoulder pain)               Treatment Diagnosis:    Chronic pain of both shoulders  Cervicalgia  Chronic pain in right shoulder  Medical/Referring Diagnosis:    Other specific arthropathies, not elsewhere classified, right shoulder    Referring Physician:  Kat Hoyos MD MD Orders:  PT Eval and Treat   Return MD Appt:     Date of Onset:  Onset Date: 24 (Chronic)     Allergies:   Patient has no known allergies.  Restrictions/Precautions:   None      Interventions Planned (Treatment may consist of any combination of the following):  Current Treatment Recommendations: Strengthening; ROM; Functional mobility training; Balance training; Neuromuscular re-education; Manual; Home exercise program; Modalities; Dry needling; Aquatics      Subjective Comments: Patient reports that she feels good today with less shoulder pain and the back is doing better    Initial Pain Level::     3/10  Post Session Pain Level:       2/10  Medications Last Reviewed:  2024  Updated Objective Findings: right shoulder GH joint and scapula still restricted  Treatment   THERAPEUTIC EXERCISE: (15 minutes):    Exercises per grid below to improve mobility and strength.  Required moderate visual, verbal, and tactile cues to promote proper body alignment,

## 2024-05-03 ENCOUNTER — HOSPITAL ENCOUNTER (OUTPATIENT)
Dept: PHYSICAL THERAPY | Age: 79
Setting detail: RECURRING SERIES
Discharge: HOME | End: 2024-05-06
Payer: MEDICARE

## 2024-05-03 PROCEDURE — 97110 THERAPEUTIC EXERCISES: CPT

## 2024-05-03 PROCEDURE — 97140 MANUAL THERAPY 1/> REGIONS: CPT

## 2024-05-03 ASSESSMENT — PAIN SCALES - GENERAL: PAINLEVEL_OUTOF10: 3

## 2024-05-03 NOTE — PROGRESS NOTES
Damari Conroy  : 1945  Primary: Medicare Part A And B (Medicare)  Secondary: CARLEY [facility] [place] [address]  Phone: [phone]  Fax: [phone] Plan Frequency: 1 time a week for 12 weeks  Plan of Care/Certification Expiration Date: 05/15/24        Plan of Care/Certification Expiration Date:  Plan of Care/Certification Expiration Date: 05/15/24    Frequency/Duration: Plan Frequency: 1 time a week for 12 weeks      Time In/Out:   Time In: 0900  Time Out: 0953      PT Visit Info:         Visit Count:  11    OUTPATIENT PHYSICAL THERAPY:   Treatment Note 5/3/2024       Episode  (Right shoulder pain)               Treatment Diagnosis:    Chronic pain of both shoulders  Cervicalgia  Chronic pain in right shoulder  Medical/Referring Diagnosis:    Other specific arthropathies, not elsewhere classified, right shoulder    Referring Physician:  Kat Hoyos MD MD Orders:  PT Eval and Treat   Return MD Appt:     Date of Onset:  Onset Date: 24 (Chronic)     Allergies:   Patient has no known allergies.  Restrictions/Precautions:   None      Interventions Planned (Treatment may consist of any combination of the following):  Current Treatment Recommendations: Strengthening; ROM; Functional mobility training; Balance training; Neuromuscular re-education; Manual; Home exercise program; Modalities; Dry needling; Aquatics      Subjective Comments: Patient reports that she has been busy this week. She feels like the shoulder is a little better    Initial Pain Level::     3/10  Post Session Pain Level:       1/10  Medications Last Reviewed:  5/3/2024  Updated Objective Findings: right shoulder GH joint and scapula still restricted  Treatment   THERAPEUTIC EXERCISE: (23 minutes):    Exercises per grid below to improve mobility and strength.  Required moderate visual, verbal, and tactile cues to promote proper body alignment,

## 2024-05-10 ENCOUNTER — HOSPITAL ENCOUNTER (OUTPATIENT)
Dept: PHYSICAL THERAPY | Age: 79
Setting detail: RECURRING SERIES
Discharge: HOME | End: 2024-05-13
Payer: MEDICARE

## 2024-05-10 PROCEDURE — 97140 MANUAL THERAPY 1/> REGIONS: CPT

## 2024-05-10 PROCEDURE — 97110 THERAPEUTIC EXERCISES: CPT

## 2024-05-10 ASSESSMENT — PAIN SCALES - GENERAL: PAINLEVEL_OUTOF10: 4

## 2024-05-10 NOTE — PROGRESS NOTES
Damari Conroy  : 1945  Primary: Medicare Part A And B (Medicare)  Secondary: CARLEY [facility] [place] [facility] [place] [address]  Phone: [phone]  Fax: [phone] Plan Frequency: 1 time a week for 12 weeks  Plan of Care/Certification Expiration Date: 24        Plan of Care/Certification Expiration Date:  Plan of Care/Certification Expiration Date: 24    Frequency/Duration: Plan Frequency: 1 time a week for 12 weeks      Time In/Out:   Time In: 1400  Time Out: 1445      PT Visit Info:         Visit Count:  12    OUTPATIENT PHYSICAL THERAPY:   Treatment Note 5/10/2024       Episode  (Right shoulder pain)               Treatment Diagnosis:    Chronic pain of both shoulders  Cervicalgia  Chronic pain in right shoulder  Medical/Referring Diagnosis:    Other specific arthropathies, not elsewhere classified, right shoulder    Referring Physician:  Kat Hoyos MD MD Orders:  PT Eval and Treat   Return MD Appt:     Date of Onset:  Onset Date: 24 (Chronic)     Allergies:   Patient has no known allergies.  Restrictions/Precautions:   None      Interventions Planned (Treatment may consist of any combination of the following):  Current Treatment Recommendations: Strengthening; ROM; Functional mobility training; Balance training; Neuromuscular re-education; Manual; Home exercise program; Modalities; Dry needling; Aquatics      Subjective Comments: Patient reports that her shoulder has been about the same,but moves a little better at times    Initial Pain Level::     4/10  Post Session Pain Level:       2/10  Medications Last Reviewed:  5/10/2024  Updated Objective Findings: right shoulder GH joint and scapula still restricted  Treatment   THERAPEUTIC EXERCISE: (15 minutes):    Exercises per grid below to improve mobility and strength.  Required moderate visual, verbal, and tactile cues to promote proper body alignment,

## 2024-05-10 NOTE — THERAPY RECERTIFICATION
Damari Conroy  : 1945  Primary: Medicare Part A And B (Medicare)  Secondary: CARLEY [facility] [place] [address]  Phone: [phone]  Fax: [phone] Plan Frequency: 1 time a week for 12 weeks    Plan of Care/Certification Expiration Date: 24        Plan of Care/Certification Expiration Date:  Plan of Care/Certification Expiration Date: 24    Frequency/Duration: Plan Frequency: 1 time a week for 12 weeks      Time In/Out:   Time In: 1400  Time Out: 1445      PT Visit Info:         Visit Count:  12                OUTPATIENT PHYSICAL THERAPY:             Recertification 5/10/2024               Episode (Right shoulder pain)         Treatment Diagnosis:     Chronic pain of both shoulders  Cervicalgia  Chronic pain in right shoulder  Medical/Referring Diagnosis:    Other specific arthropathies, not elsewhere classified, right shoulder    Referring Physician:  Kat Hoyos MD MD Orders:  PT Eval and Treat   Return MD Appt:  not sure  Date of Onset:  Onset Date: 24 (Chronic)     Allergies:  Patient has no known allergies.  Restrictions/Precautions:    None      Medications Last Reviewed:  5/10/2024     SUBJECTIVE   History of Injury/Illness (Reason for Referral):  Patient has a previous history of right shoulder pain and rotator cuff damage as well as arthritis. She had a fall last April and hit her head and broke her right wrist.  She reports that she was diagnosed with memory issues after that and had to stop driving.  She feels like her shoulders have still be really bad since then and she wants to put off a shoulder replacement as long as possible. She reports continued pain and stiffness in the Right shoulder more than left. She also reports continued pain in the cervical spine.  The left shoulder moves better, but still has a lot of pain too  Patient Stated Goal(s):  \"prevent shoulder

## 2024-05-15 ENCOUNTER — HOSPITAL ENCOUNTER (OUTPATIENT)
Dept: PHYSICAL THERAPY | Age: 79
Setting detail: RECURRING SERIES
Discharge: HOME | End: 2024-05-18
Payer: MEDICARE

## 2024-05-15 PROCEDURE — 97140 MANUAL THERAPY 1/> REGIONS: CPT

## 2024-05-15 PROCEDURE — 97110 THERAPEUTIC EXERCISES: CPT

## 2024-05-15 ASSESSMENT — PAIN SCALES - GENERAL: PAINLEVEL_OUTOF10: 5

## 2024-05-15 NOTE — PROGRESS NOTES
joint B for decreased pain with mobility  -inferior glide in abduction to regain joint motion.  PROM all planes with gentle end range stretches R shoulder in supine  -Side lie mobilization to scapula for upward and downward rotation      HEP-supine wand flexion B, push press and pendulum, scap retractions, pulleys, rows and pulldowns    Treatment/Session Summary:    Treatment Assessment: Patient shows improved shoulder motion today with supine flexion. Continue to work on scapular strength  Communication/Consultation:  None today  Equipment provided today:  HEP Access Code [email or web address], red band  Recommendations/Intent for next treatment session: Next visit will focus on ROM.    >Total Treatment Billable Duration:  45 minutes   Time In: 0900  Time Out: 0949    Walt Garcia PT         Charge Capture  [employer] Portal  Appt Desk     Future Appointments   Date Time Provider Department Center   5/22/2024  9:00 AM Walt Garcia, PT [facility]   5/28/2024  1:00 PM Walt Garcia, PT [facility]   6/5/2024  9:00 AM Walt Garcia, PT [facility]   6/12/2024  9:00 AM Walt Garcia, PT [facility]   6/19/2024  9:00 AM Walt Garcia, PT [facility]

## 2024-05-22 ENCOUNTER — HOSPITAL ENCOUNTER (OUTPATIENT)
Dept: PHYSICAL THERAPY | Age: 79
Setting detail: RECURRING SERIES
Discharge: HOME | End: 2024-05-25
Payer: MEDICARE

## 2024-05-22 PROCEDURE — 97140 MANUAL THERAPY 1/> REGIONS: CPT

## 2024-05-22 PROCEDURE — 97110 THERAPEUTIC EXERCISES: CPT

## 2024-05-22 ASSESSMENT — PAIN SCALES - GENERAL: PAINLEVEL_OUTOF10: 3

## 2024-05-22 NOTE — PROGRESS NOTES
body mechanics, and promote proper body breathing techniques.  Progressed resistance, range, and repetitions as indicated.   Date:  3/22/24 Date:  3/29/24 Date:  4/1/24 Date:  4/12/24 Date:  4/17/24 Date:  4/24/24 Date:  5/3/24 Date:  5/10/24 Date:  5/15/24 Date:  5/22/24   Activity/Exercise             education             UBE 2 min forward Nu step x 6 min L1 Nu step x 5 min L1 Nu step x 7 min L1 Nu step x 7 min L1 Nu step x 7 min L1 Nu step x 7 min L1 Nu step x 5 min L1 Nu step x 5 min L1 Nu step x 8 min L1   Scapular retractions         X 20 X 20   Supine wand flexion 1# 2 x 10  2# x 10 1 pound 2 x 10  2 pound 2 x 10 2# 3 x 10 2# 3 x 10 2# 3 x 10 2# 3 x 10 2# 3 x 10 2# 3 x 10 2# 3 x 10 2# 3 x 10   Supine wand press 2# 2 x 10 2 pound 2 x 10 2# 3 x 10 2# 3 x 10 2# 3 x 10 2# 3 x 10 2# 3 x 10 2# 3 x 10 2# 3 x 10 2# 3 x 10   Supine wand horizontal abduction/adduction   2# 2x10 2# 3 x 10 2# 3 x 10 2# 3 x 10 2# 3 x 10      Pendulum swings             Pulleys shoulder flexion X 2 min   X 3 min X 3 min X 3 min X 3 min X 3 min X 3 min X 3 min   Rows/pulldowns Red x 20 Bent over rows with 2 pounds x 10 Bent over rows 2# 2x10 Bent over rows 2# 2x10 Bent over rows 2# 2x10  Red band x 20  Bent over rows 2# 2x10  Red band x 20 B green band x 20 each 2# 3 x 10 3# 3 x 10   Shoulder extension   Bent over 1# 2x10 Bent over 1# 2x10 Bent over 1# 2x10  Red band x 20   Green band x 20     Wand ER Right side 20 x 5 sec hold Right side 20 x 5 sec hold R 2# 2x10 X 20  X 20 X 20      Bicep curls Supine with yellow band B 2 x 10            Wall slides  X 10  X 10 for circles X 20 for circles  X 10 for flexion  X 20 for circles  X 10 for flexion X 20 for circles  X 10 for flexion X 20 for circles  X 10 for flexion X 20 for circles  X 10 for flexion                    MANUAL THERAPY: (30 minutes):   Joint mobilization and Soft tissue mobilization was utilized and necessary because of the patient's restricted joint motion, loss of articular

## 2024-05-28 ENCOUNTER — APPOINTMENT (OUTPATIENT)
Dept: PHYSICAL THERAPY | Age: 79
End: 2024-05-28
Payer: MEDICARE

## 2024-06-05 ENCOUNTER — HOSPITAL ENCOUNTER (OUTPATIENT)
Dept: PHYSICAL THERAPY | Age: 79
Setting detail: RECURRING SERIES
Discharge: HOME | End: 2024-06-08
Payer: MEDICARE

## 2024-06-05 PROCEDURE — 97110 THERAPEUTIC EXERCISES: CPT

## 2024-06-05 PROCEDURE — 97140 MANUAL THERAPY 1/> REGIONS: CPT

## 2024-06-05 NOTE — PROGRESS NOTES
Damari Conroy  : 1945  Primary: Medicare Part A And B (Medicare)  Secondary: [facility] [place] [address]  Phone: [phone]  Fax: [phone] Plan Frequency: 1 time a week for 12 weeks  Plan of Care/Certification Expiration Date: 24        Plan of Care/Certification Expiration Date:  Plan of Care/Certification Expiration Date: 24    Frequency/Duration: Plan Frequency: 1 time a week for 12 weeks      Time In/Out:   Time In: 858  Time Out: 955      PT Visit Info:         Visit Count:  15    OUTPATIENT PHYSICAL THERAPY:   Treatment Note 2024       Episode  (Right shoulder pain)               Treatment Diagnosis:    Chronic pain of both shoulders  Cervicalgia  Chronic pain in right shoulder  Medical/Referring Diagnosis:    Other specific arthropathies, not elsewhere classified, right shoulder    Referring Physician:  Kat Hoyos MD MD Orders:  PT Eval and Treat   Return MD Appt:     Date of Onset:  Onset Date: 24 (Chronic)     Allergies:   Patient has no known allergies.  Restrictions/Precautions:   None      Interventions Planned (Treatment may consist of any combination of the following):  Current Treatment Recommendations: Strengthening; ROM; Functional mobility training; Balance training; Neuromuscular re-education; Manual; Home exercise program; Modalities; Dry needling; Aquatics      Subjective Comments: Patient reports that she feels pretty good today    Initial Pain Level::      /10  Post Session Pain Level:        /10  Medications Last Reviewed:  2024  Updated Objective Findings: right shoulder GH joint and scapula still restricted  Treatment   THERAPEUTIC EXERCISE: (25 minutes):    Exercises per grid below to improve mobility and strength.  Required moderate visual, verbal, and tactile cues to promote proper body alignment, promote proper body posture, promote proper body

## 2024-06-12 ENCOUNTER — HOSPITAL ENCOUNTER (OUTPATIENT)
Dept: PHYSICAL THERAPY | Age: 79
Setting detail: RECURRING SERIES
Discharge: HOME | End: 2024-06-15
Payer: MEDICARE

## 2024-06-12 PROCEDURE — 97110 THERAPEUTIC EXERCISES: CPT

## 2024-06-12 PROCEDURE — 97140 MANUAL THERAPY 1/> REGIONS: CPT

## 2024-06-12 ASSESSMENT — PAIN SCALES - GENERAL: PAINLEVEL_OUTOF10: 3

## 2024-06-12 NOTE — PROGRESS NOTES
Damari Conroy  : 1945  Primary: Medicare Part A And B (Medicare)  Secondary: [facility] [address]  [facility] [address]  Phone: [phone]  Fax: [phone] Plan Frequency: 1 time a week for 12 weeks  Plan of Care/Certification Expiration Date: 24        Plan of Care/Certification Expiration Date:  Plan of Care/Certification Expiration Date: 24    Frequency/Duration: Plan Frequency: 1 time a week for 12 weeks      Time In/Out:   Time In: 857  Time Out: 953      PT Visit Info:         Visit Count:  16    OUTPATIENT PHYSICAL THERAPY:   Treatment Note 2024       Episode  (Right shoulder pain)               Treatment Diagnosis:    Chronic pain of both shoulders  Cervicalgia  Chronic pain in right shoulder  Medical/Referring Diagnosis:    Other specific arthropathies, not elsewhere classified, right shoulder    Referring Physician:  Kat Hoyos MD MD Orders:  PT Eval and Treat   Return MD Appt:     Date of Onset:  Onset Date: 24 (Chronic)     Allergies:   Patient has no known allergies.  Restrictions/Precautions:   None      Interventions Planned (Treatment may consist of any combination of the following):  Current Treatment Recommendations: Strengthening; ROM; Functional mobility training; Balance training; Neuromuscular re-education; Manual; Home exercise program; Modalities; Dry needling; Aquatics      Subjective Comments: Patient reports that she feels pretty good today and has been working on the shoulder more    Initial Pain Level::     3/10  Post Session Pain Level:       2/10  Medications Last Reviewed:  2024  Updated Objective Findings: right shoulder GH joint and scapula still restricted  Treatment   THERAPEUTIC EXERCISE: (25 minutes):    Exercises per grid below to improve mobility and strength.  Required moderate visual, verbal, and tactile cues to promote proper body alignment,

## 2024-06-19 ENCOUNTER — HOSPITAL ENCOUNTER (OUTPATIENT)
Dept: PHYSICAL THERAPY | Age: 79
Setting detail: RECURRING SERIES
Discharge: HOME | End: 2024-06-22
Payer: MEDICARE

## 2024-06-19 PROCEDURE — 97140 MANUAL THERAPY 1/> REGIONS: CPT

## 2024-06-19 PROCEDURE — 97110 THERAPEUTIC EXERCISES: CPT

## 2024-06-19 ASSESSMENT — PAIN SCALES - GENERAL: PAINLEVEL_OUTOF10: 2

## 2024-06-19 NOTE — PROGRESS NOTES
Damari Conroy  : 1945  Primary: Medicare Part A And B (Medicare)  Secondary: CARLEY [facility] [place] [address]  Phone: [phone]  Fax: [phone] Plan Frequency: 1 time a week for 12 weeks  Plan of Care/Certification Expiration Date: 24        Plan of Care/Certification Expiration Date:  Plan of Care/Certification Expiration Date: 24    Frequency/Duration: Plan Frequency: 1 time a week for 12 weeks      Time In/Out:   Time In: 0900  Time Out: 09      PT Visit Info:         Visit Count:  17    OUTPATIENT PHYSICAL THERAPY:   Treatment Note 2024       Episode  (Right shoulder pain)               Treatment Diagnosis:    Chronic pain of both shoulders  Cervicalgia  Chronic pain in right shoulder  Medical/Referring Diagnosis:    Other specific arthropathies, not elsewhere classified, right shoulder    Referring Physician:  Kat Hoyos MD MD Orders:  PT Eval and Treat   Return MD Appt:     Date of Onset:  Onset Date: 24 (Chronic)     Allergies:   Patient has no known allergies.  Restrictions/Precautions:   None      Interventions Planned (Treatment may consist of any combination of the following):  Current Treatment Recommendations: Strengthening; ROM; Functional mobility training; Balance training; Neuromuscular re-education; Manual; Home exercise program; Modalities; Dry needling; Aquatics      Subjective Comments: Patient reports that she is doing well for the most part and staying busy.    Initial Pain Level::     2/10  Post Session Pain Level:       1/10  Medications Last Reviewed:  2024  Updated Objective Findings: right shoulder GH joint and scapula still restricted  Treatment   THERAPEUTIC EXERCISE: (25 minutes):    Exercises per grid below to improve mobility and strength.  Required moderate visual, verbal, and tactile cues to promote proper body alignment, promote proper body

## 2024-07-10 ENCOUNTER — HOSPITAL ENCOUNTER (OUTPATIENT)
Dept: PHYSICAL THERAPY | Age: 79
Setting detail: RECURRING SERIES
Discharge: HOME | End: 2024-07-13
Payer: MEDICARE

## 2024-07-10 PROCEDURE — 97140 MANUAL THERAPY 1/> REGIONS: CPT

## 2024-07-10 PROCEDURE — 97110 THERAPEUTIC EXERCISES: CPT

## 2024-07-10 ASSESSMENT — PAIN SCALES - GENERAL: PAINLEVEL_OUTOF10: 3

## 2024-07-10 NOTE — PROGRESS NOTES
Damari Conroy  : 1945  Primary: Medicare Part A And B (Medicare)  Secondary: CARLEY [facility] [place] [address]  Phone: [phone]  Fax: [phone] Plan Frequency: 1 time a week for 12 weeks  Plan of Care/Certification Expiration Date: 24        Plan of Care/Certification Expiration Date:  Plan of Care/Certification Expiration Date: 24    Frequency/Duration: Plan Frequency: 1 time a week for 12 weeks      Time In/Out:   Time In: 0900  Time Out: 09      PT Visit Info:         Visit Count:  18    OUTPATIENT PHYSICAL THERAPY:   Treatment Note 7/10/2024       Episode  (Right shoulder pain)               Treatment Diagnosis:    Chronic pain of both shoulders  Cervicalgia  Chronic pain in right shoulder  Medical/Referring Diagnosis:    Other specific arthropathies, not elsewhere classified, right shoulder    Referring Physician:  Kat Hoyos MD MD Orders:  PT Eval and Treat   Return MD Appt:     Date of Onset:  Onset Date: 24 (Chronic)     Allergies:   Patient has no known allergies.  Restrictions/Precautions:   None      Interventions Planned (Treatment may consist of any combination of the following):  Current Treatment Recommendations: Strengthening; ROM; Functional mobility training; Balance training; Neuromuscular re-education; Manual; Home exercise program; Modalities; Dry needling; Aquatics      Subjective Comments: Patient reports that she has been forgetting things more lately and she may have to move in with her daughter    Initial Pain Level::     310  Post Session Pain Level:       /10  Medications Last Reviewed:  7/10/2024  Updated Objective Findings: right shoulder GH joint and scapula still restricted  Treatment   THERAPEUTIC EXERCISE: (13 minutes):    Exercises per grid below to improve mobility and strength.  Required moderate visual, verbal, and tactile cues to promote proper

## 2024-07-17 ENCOUNTER — HOSPITAL ENCOUNTER (OUTPATIENT)
Dept: PHYSICAL THERAPY | Age: 79
Setting detail: RECURRING SERIES
Discharge: HOME | End: 2024-07-20
Payer: MEDICARE

## 2024-07-17 PROCEDURE — 97140 MANUAL THERAPY 1/> REGIONS: CPT

## 2024-07-17 PROCEDURE — 97110 THERAPEUTIC EXERCISES: CPT

## 2024-07-17 ASSESSMENT — PAIN SCALES - GENERAL: PAINLEVEL_OUTOF10: 4

## 2024-07-17 NOTE — PROGRESS NOTES
restricted motion of soft tissue.   -Distraction and gentle oscillations to GH joint B for decreased pain with mobility  -inferior glide in abduction to regain joint motion.  PROM all planes with gentle end range stretches R shoulder in supine  -Side lie mobilization to scapula for upward and downward rotation      HEP-supine wand flexion B, push press and pendulum, scap retractions, pulleys, rows and pulldowns    Treatment/Session Summary:    Treatment Assessment: Patient shows improved shoulder motion today with some pain, but continues to work through it  Communication/Consultation:  None today  Equipment provided today:  HEP Access Code [email or web address], red band  Recommendations/Intent for next treatment session: Next visit will focus on ROM.    >Total Treatment Billable Duration:  55 minutes   Time In: 0856  Time Out: 0957    Walt Garcia PT         Charge Capture  [employer] Portal  Appt Desk     Future Appointments   Date Time Provider Department Center   7/24/2024  9:00 AM Walt Garcia, PT [facility]   8/9/2024 10:00 AM Walt Garcia, PT [facility]   8/14/2024  9:00 AM Walt Garcia, PT [facility]   8/21/2024  9:00 AM Walt Garcia, PT [facility]   8/28/2024  9:00 AM Walt Garcia, PT [facility]

## 2024-07-24 ENCOUNTER — HOSPITAL ENCOUNTER (OUTPATIENT)
Dept: PHYSICAL THERAPY | Age: 79
Setting detail: RECURRING SERIES
Discharge: HOME | End: 2024-07-27
Payer: MEDICARE

## 2024-07-24 PROCEDURE — 97110 THERAPEUTIC EXERCISES: CPT

## 2024-07-24 PROCEDURE — 97140 MANUAL THERAPY 1/> REGIONS: CPT

## 2024-07-24 NOTE — THERAPY RECERTIFICATION
shoulder mobility.      Regarding Damari Conroy's therapy, I certify that the treatment plan above will be carried out by a therapist or under their direction.  Thank you for this referral,  Walt Garcia, PT     Referring Physician Signature: Kat Hoyos* _______________________________ Date _____________        Charge Capture  Appt Desk

## 2024-07-24 NOTE — PROGRESS NOTES
body posture, promote proper body mechanics, and promote proper body breathing techniques.  Progressed resistance, range, and repetitions as indicated.   Date:  5/3/24 Date:  5/10/24 Date:  5/15/24 Date:  5/22/24 Date:  6/5/24 Date:  6/12/24 Date:  6/19/24 Date:  7/10/24 Date:  7/17/24 Date:  7/24/24   Activity/Exercise             education             UBE Nu step x 7 min L1 Nu step x 5 min L1 Nu step x 5 min L1 Nu step x 8 min L1 Nu step x 8 min L1 Nu step x 8 min L1 Nu step x 8 min L1 Nu step x 6 min L1 Nu step x 8 min L1 Nu step x 8 min L1   Scapular retractions   X 20 X 20 X 20 X 20 X 20 X 20 X 30 X 30   Supine wand flexion 2# 3 x 10 2# 3 x 10 2# 3 x 10 2# 3 x 10 2# 3 x 10 2# 3 x 10 2# 3 x 10 2# 3 x 10 3# 3 x 10 3# 3 x 10   Supine wand press 2# 3 x 10 2# 3 x 10 2# 3 x 10 2# 3 x 10 2# 3 x 10 2# 3 x 10 2# 3 x 10 2# 3 x 10 3# 3 x 10 3# 3 x 10   Supine wand horizontal abduction/adduction 2# 3 x 10            Pendulum swings             Pulleys shoulder flexion X 3 min X 3 min X 3 min X 3 min X 3 min X 3 min X 3 min X 3 min X 3 min X 2 min   Rows/pulldowns Bent over rows 2# 2x10  Red band x 20 B green band x 20 each 2# 3 x 10 3# 3 x 10 B green band x 20 each 3# 3 x 10  B green band x 20 each 3# 3 x 10  B green band x 20 each B green band x 20 each B green band x 20 each B green band x 30 each   Shoulder extension  Green band x 20    Green band B x 20 Green band B x 20 B green band x 20 each B green band x 20 each B green band x 30 each   Wand ER X 20            Bicep curls             Wall slides X 20 for circles  X 10 for flexion X 20 for circles  X 10 for flexion X 20 for circles  X 10 for flexion X 20 for circles  X 10 for flexion X 20 for circles  X 10 for flexion X 20 for circles  X 10 for flexion X 20 for circles  2 X 10 for flexion X 20 for circles  2 X 10 for flexion X 20 for circles  2 X 10 for flexion X 20 for circles  3 X 10 for flexion   Wall ladder     X 3 X 3 X 3 X 3 X 3 X 5       MANUAL THERAPY: (25

## 2024-08-06 ENCOUNTER — HOSPITAL ENCOUNTER (OUTPATIENT)
Dept: PHYSICAL THERAPY | Age: 79
Setting detail: RECURRING SERIES
Discharge: HOME | End: 2024-08-09
Payer: MEDICARE

## 2024-08-06 PROCEDURE — 97110 THERAPEUTIC EXERCISES: CPT

## 2024-08-06 PROCEDURE — 97140 MANUAL THERAPY 1/> REGIONS: CPT

## 2024-08-06 ASSESSMENT — PAIN SCALES - GENERAL: PAINLEVEL_OUTOF10: 4

## 2024-08-06 NOTE — PROGRESS NOTES
Damari Conroy  : 1945  Primary: Medicare Part A And B (Medicare)  Secondary: CAR[facility] [place] [address]  Phone: [phone]  Fax: [phone] Plan Frequency: 1 time a week for 12 weeks  Plan of Care/Certification Expiration Date: 10/22/24        Plan of Care/Certification Expiration Date:  Plan of Care/Certification Expiration Date: 10/22/24    Frequency/Duration: Plan Frequency: 1 time a week for 12 weeks      Time In/Out:   Time In: 1500  Time Out: 1548      PT Visit Info:         Visit Count:  21    OUTPATIENT PHYSICAL THERAPY:   Treatment Note 2024       Episode  (Right shoulder pain)               Treatment Diagnosis:    Chronic pain of both shoulders  Cervicalgia  Chronic pain in right shoulder  Medical/Referring Diagnosis:    Other specific arthropathies, not elsewhere classified, right shoulder    Referring Physician:  Kat Hoyos MD MD Orders:  PT Eval and Treat   Return MD Appt:     Date of Onset:  Onset Date: 24 (Chronic)     Allergies:   Patient has no known allergies.  Restrictions/Precautions:   None      Interventions Planned (Treatment may consist of any combination of the following):  Current Treatment Recommendations: Strengthening; ROM; Functional mobility training; Balance training; Neuromuscular re-education; Manual; Home exercise program; Modalities; Dry needling; Aquatics      Subjective Comments: Patient reports that her shoulder is a little sore and stiff today    Initial Pain Level::     4/10  Post Session Pain Level:       2/10  Medications Last Reviewed:  2024  Updated Objective Findings: Continues to have tightness and guarding with mobility training  Treatment   THERAPEUTIC EXERCISE: (15 minutes):    Exercises per grid below to improve mobility and strength.  Required moderate visual, verbal, and tactile cues to promote proper body alignment, promote proper body

## 2024-08-09 ENCOUNTER — APPOINTMENT (OUTPATIENT)
Dept: PHYSICAL THERAPY | Age: 79
End: 2024-08-09
Payer: MEDICARE

## 2024-08-14 ENCOUNTER — HOSPITAL ENCOUNTER (OUTPATIENT)
Dept: PHYSICAL THERAPY | Age: 79
Setting detail: RECURRING SERIES
Discharge: HOME | End: 2024-08-17
Payer: MEDICARE

## 2024-08-14 PROCEDURE — 97140 MANUAL THERAPY 1/> REGIONS: CPT

## 2024-08-14 PROCEDURE — 97110 THERAPEUTIC EXERCISES: CPT

## 2024-08-14 ASSESSMENT — PAIN SCALES - GENERAL: PAINLEVEL_OUTOF10: 4

## 2024-08-14 NOTE — PROGRESS NOTES
Damari Conroy  : 1945  Primary: Medicare Part A And B (Medicare)  Secondary: [facility] [place] [address]  Phone: [phone]  Fax: [phone] Plan Frequency: 1 time a week for 12 weeks  Plan of Care/Certification Expiration Date: 10/22/24        Plan of Care/Certification Expiration Date:  Plan of Care/Certification Expiration Date: 10/22/24    Frequency/Duration: Plan Frequency: 1 time a week for 12 weeks      Time In/Out:   Time In: 0900  Time Out: 0954      PT Visit Info:         Visit Count:  22    OUTPATIENT PHYSICAL THERAPY:   Treatment Note 2024       Episode  (Right shoulder pain)               Treatment Diagnosis:    Chronic pain of both shoulders  Cervicalgia  Chronic pain in right shoulder  Medical/Referring Diagnosis:    Other specific arthropathies, not elsewhere classified, right shoulder    Referring Physician:  Kat Hoyos MD MD Orders:  PT Eval and Treat   Return MD Appt:     Date of Onset:  Onset Date: 24 (Chronic)     Allergies:   Patient has no known allergies.  Restrictions/Precautions:   None      Interventions Planned (Treatment may consist of any combination of the following):  Current Treatment Recommendations: Strengthening; ROM; Functional mobility training; Balance training; Neuromuscular re-education; Manual; Home exercise program; Modalities; Dry needling; Aquatics      Subjective Comments: Patient reports that she is doing ok today and not warmed up today    Initial Pain Level::     4/10  Post Session Pain Level:       2/10  Medications Last Reviewed:  2024  Updated Objective Findings: Continues to have tightness and guarding with mobility training  Treatment   THERAPEUTIC EXERCISE: (14 minutes):    Exercises per grid below to improve mobility and strength.  Required moderate visual, verbal, and tactile cues to promote proper body alignment, promote proper

## 2024-08-21 ENCOUNTER — HOSPITAL ENCOUNTER (OUTPATIENT)
Dept: PHYSICAL THERAPY | Age: 79
Setting detail: RECURRING SERIES
Discharge: HOME | End: 2024-08-24
Payer: MEDICARE

## 2024-08-21 PROCEDURE — 97140 MANUAL THERAPY 1/> REGIONS: CPT

## 2024-08-21 PROCEDURE — 97110 THERAPEUTIC EXERCISES: CPT

## 2024-08-21 ASSESSMENT — PAIN SCALES - GENERAL: PAINLEVEL_OUTOF10: 4

## 2024-08-21 NOTE — PROGRESS NOTES
promote proper body alignment, promote proper body posture, promote proper body mechanics, and promote proper body breathing techniques.  Progressed resistance, range, and repetitions as indicated.   Date:  5/3/24 Date:  5/10/24 Date:  5/15/24 Date:  5/22/24 Date:  6/5/24 Date:  6/12/24 Date:  6/19/24 Date:  7/10/24 Date:  7/17/24 Date:  7/24/24 Date:  8/6/24 Date:  8/14/24 Date:  8/21/24   Activity/Exercise                education                UBE Nu step x 7 min L1 Nu step x 5 min L1 Nu step x 5 min L1 Nu step x 8 min L1 Nu step x 8 min L1 Nu step x 8 min L1 Nu step x 8 min L1 Nu step x 6 min L1 Nu step x 8 min L1 Nu step x 8 min L1 Nu step x 5 min L1 Nu step x 8 min L1 Nu step x 8 min L1   Scapular retractions   X 20 X 20 X 20 X 20 X 20 X 20 X 30 X 30 X 30 X 30 X 30   Supine wand flexion 2# 3 x 10 2# 3 x 10 2# 3 x 10 2# 3 x 10 2# 3 x 10 2# 3 x 10 2# 3 x 10 2# 3 x 10 3# 3 x 10 3# 3 x 10 3# 3 x 10 3# 3 x 10 3# 3 x 10   Supine wand press 2# 3 x 10 2# 3 x 10 2# 3 x 10 2# 3 x 10 2# 3 x 10 2# 3 x 10 2# 3 x 10 2# 3 x 10 3# 3 x 10 3# 3 x 10 3# 3 x 10 3# 3 x 10 3# 3 x 10   Supine wand horizontal abduction/adduction 2# 3 x 10               Pendulum swings                Pulleys shoulder flexion X 3 min X 3 min X 3 min X 3 min X 3 min X 3 min X 3 min X 3 min X 3 min X 2 min X 3 min     Rows/pulldowns Bent over rows 2# 2x10  Red band x 20 B green band x 20 each 2# 3 x 10 3# 3 x 10 B green band x 20 each 3# 3 x 10  B green band x 20 each 3# 3 x 10  B green band x 20 each B green band x 20 each B green band x 20 each B green band x 30 each B green band x 30 each B green band x 30 each B green band x 30 each   Shoulder extension  Green band x 20    Green band B x 20 Green band B x 20 B green band x 20 each B green band x 20 each B green band x 30 each B green band x 30 each B green band x 30 each B green band x 30 each   Wand ER X 20               Bicep curls                Wall slides X 20 for circles  X 10 for flexion X 20

## 2024-08-28 ENCOUNTER — HOSPITAL ENCOUNTER (OUTPATIENT)
Dept: PHYSICAL THERAPY | Age: 79
Setting detail: RECURRING SERIES
Discharge: HOME | End: 2024-08-31
Payer: MEDICARE

## 2024-08-28 PROCEDURE — 97110 THERAPEUTIC EXERCISES: CPT

## 2024-08-28 PROCEDURE — 97140 MANUAL THERAPY 1/> REGIONS: CPT

## 2024-08-28 ASSESSMENT — PAIN SCALES - GENERAL: PAINLEVEL_OUTOF10: 4

## 2024-08-28 NOTE — PROGRESS NOTES
Damari Conroy  : 1945  Primary: Medicare Part A And B (Medicare)  Secondary: CAR[facility] [place] [address]  Phone: [phone]  Fax: [phone] Plan Frequency: 1 time a week for 12 weeks  Plan of Care/Certification Expiration Date: 10/22/24        Plan of Care/Certification Expiration Date:  Plan of Care/Certification Expiration Date: 10/22/24    Frequency/Duration: Plan Frequency: 1 time a week for 12 weeks      Time In/Out:   Time In: 0900  Time Out: 0955      PT Visit Info:         Visit Count:  24    OUTPATIENT PHYSICAL THERAPY:   Treatment Note 2024       Episode  (Right shoulder pain)               Treatment Diagnosis:    Chronic pain of both shoulders  Cervicalgia  Chronic pain in right shoulder  Medical/Referring Diagnosis:    Other specific arthropathies, not elsewhere classified, right shoulder    Referring Physician:  Kat Hoyos MD MD Orders:  PT Eval and Treat   Return MD Appt:     Date of Onset:  Onset Date: 24 (Chronic)     Allergies:   Patient has no known allergies.  Restrictions/Precautions:   None      Interventions Planned (Treatment may consist of any combination of the following):  Current Treatment Recommendations: Strengthening; ROM; Functional mobility training; Balance training; Neuromuscular re-education; Manual; Home exercise program; Modalities; Dry needling; Aquatics      Subjective Comments: Patient reports that she feels the shoulder is doing well today    Initial Pain Level::     4/10  Post Session Pain Level:       3/10  Medications Last Reviewed:  2024  Updated Objective Findings: Continues to have tightness and guarding with mobility training  Treatment   THERAPEUTIC EXERCISE: (25 minutes):    Exercises per grid below to improve mobility and strength.  Required moderate visual, verbal, and tactile cues to promote proper body alignment, promote proper body

## 2024-09-09 ENCOUNTER — HOSPITAL ENCOUNTER (OUTPATIENT)
Dept: PHYSICAL THERAPY | Age: 79
Setting detail: RECURRING SERIES
Discharge: HOME | End: 2024-09-12
Payer: MEDICARE

## 2024-09-09 PROCEDURE — 97140 MANUAL THERAPY 1/> REGIONS: CPT

## 2024-09-09 PROCEDURE — 97110 THERAPEUTIC EXERCISES: CPT

## 2024-09-09 ASSESSMENT — PAIN SCALES - GENERAL: PAINLEVEL_OUTOF10: 4

## 2024-09-25 ENCOUNTER — HOSPITAL ENCOUNTER (OUTPATIENT)
Dept: PHYSICAL THERAPY | Age: 79
Setting detail: RECURRING SERIES
Discharge: HOME | End: 2024-09-28
Payer: MEDICARE

## 2024-09-25 PROCEDURE — 97140 MANUAL THERAPY 1/> REGIONS: CPT

## 2024-09-25 PROCEDURE — 97110 THERAPEUTIC EXERCISES: CPT

## 2024-09-25 ASSESSMENT — PAIN SCALES - GENERAL: PAINLEVEL_OUTOF10: 5

## 2024-10-04 ENCOUNTER — HOSPITAL ENCOUNTER (OUTPATIENT)
Dept: PHYSICAL THERAPY | Age: 79
Setting detail: RECURRING SERIES
Discharge: HOME | End: 2024-10-07
Payer: MEDICARE

## 2024-10-04 PROCEDURE — 97140 MANUAL THERAPY 1/> REGIONS: CPT

## 2024-10-04 PROCEDURE — 97110 THERAPEUTIC EXERCISES: CPT

## 2024-10-04 ASSESSMENT — PAIN SCALES - GENERAL: PAINLEVEL_OUTOF10: 4

## 2024-10-04 NOTE — PROGRESS NOTES
Damari Conroy  : 1945  Primary: Medicare Part A And B (Medicare)  Secondary: [facility] [place] [address]  Phone: [phone]  Fax: [phone] Plan Frequency: 1 time a week for 12 weeks  Plan of Care/Certification Expiration Date: 10/22/24        Plan of Care/Certification Expiration Date:  Plan of Care/Certification Expiration Date: 10/22/24    Frequency/Duration: Plan Frequency: 1 time a week for 12 weeks      Time In/Out:   Time In: 1400  Time Out: 1440      PT Visit Info:         Visit Count:  27    OUTPATIENT PHYSICAL THERAPY:   Treatment Note 10/4/2024       Episode  (Right shoulder pain)               Treatment Diagnosis:    Chronic pain of both shoulders  Cervicalgia  Chronic pain in right shoulder  Medical/Referring Diagnosis:    Other specific arthropathies, not elsewhere classified, right shoulder    Referring Physician:  Kat Hoyos MD MD Orders:  PT Eval and Treat   Return MD Appt:     Date of Onset:  Onset Date: 24 (Chronic)     Allergies:   Patient has no known allergies.  Restrictions/Precautions:   None      Interventions Planned (Treatment may consist of any combination of the following):  Current Treatment Recommendations: Strengthening; ROM; Functional mobility training; Balance training; Neuromuscular re-education; Manual; Home exercise program; Modalities; Dry needling; Aquatics      Subjective Comments: Patient reports that she is doing ok, but still has some pain,but working on her mobility    Initial Pain Level::     4/10  Post Session Pain Level:       3/10  Medications Last Reviewed:  10/4/2024  Updated Objective Findings: Continues to have tightness and guarding with mobility training  Treatment   THERAPEUTIC EXERCISE: (15 minutes):    Exercises per grid below to improve mobility and strength.  Required moderate visual, verbal, and tactile cues to promote proper body